# Patient Record
Sex: FEMALE | Race: WHITE | NOT HISPANIC OR LATINO
[De-identification: names, ages, dates, MRNs, and addresses within clinical notes are randomized per-mention and may not be internally consistent; named-entity substitution may affect disease eponyms.]

---

## 2020-05-13 ENCOUNTER — APPOINTMENT (OUTPATIENT)
Dept: NEUROLOGY | Facility: CLINIC | Age: 55
End: 2020-05-13
Payer: COMMERCIAL

## 2020-05-13 ENCOUNTER — NON-APPOINTMENT (OUTPATIENT)
Age: 55
End: 2020-05-13

## 2020-05-13 ENCOUNTER — APPOINTMENT (OUTPATIENT)
Dept: CARDIOLOGY | Facility: CLINIC | Age: 55
End: 2020-05-13
Payer: COMMERCIAL

## 2020-05-13 VITALS
BODY MASS INDEX: 25.33 KG/M2 | DIASTOLIC BLOOD PRESSURE: 85 MMHG | SYSTOLIC BLOOD PRESSURE: 138 MMHG | HEART RATE: 76 BPM | HEIGHT: 65 IN | WEIGHT: 152 LBS

## 2020-05-13 PROCEDURE — 99204 OFFICE O/P NEW MOD 45 MIN: CPT | Mod: 95

## 2020-05-13 PROCEDURE — 99205 OFFICE O/P NEW HI 60 MIN: CPT

## 2020-05-13 PROCEDURE — 0296T: CPT

## 2020-05-13 PROCEDURE — 93000 ELECTROCARDIOGRAM COMPLETE: CPT | Mod: 59

## 2020-05-13 NOTE — ASSESSMENT
[FreeTextEntry1] : #1) cardiorespiratory collapse and arrest.\par At this point, I believe this patient had a primary arrhythmia probably due to her underlying mitral valve prolapse. I disagree with the assessment at the [facility] that this was a respiratory arrest. There was no antecedent symptoms to suggest pneumonia such as fever or cough. Whatever infiltrate she had on her x-ray and CT of the chest probably relates to post arrest events. My suspicion is that she had a cardiac arrhythmia, probably ventricular tachycardia leading to VF due to mitral valve prolapse syndrome.\par Sincerely echocardiogram was normal as far as all dysfunction, it is unlikely that she has a cardiomyopathy.\par Although the patient has no risk factors for CAD, it would be imperative to rule out concomitant coronary disease or anomalous coronary arteries. It is important to also rule out IHSS.\par \par #2) mitral valve prolapse, undoubtedly related to myxomatous mitral valve prolapse syndrome. I suspect that she has moderate to severe mitral valvular insufficiency with his very prominent murmur. This certainly can induce ventricular tachyarrhythmias. We'll therefore obtain in addition to a baseline echocardiogram to see a recording.\par \par Plan:\par Resume metoprolol 25 mg p.o. b.i.d. for cardioprotection.\par Schedule cardiac CTA to rule out anomalous coronary arteries and CAD.\par Schedule cardiac echocardiogram to assess mitral valve prolapse, degree of MR, and to rule out other unsuspected cardiac valvulopathy.\par Schedule Zio recording to ascertain any ventricular tachyarrhythmias.\par \par Further plans including addressing the mitral valve insufficiency and prolapse with invasive procedures will follow these studies. In addition a consideration for formal electrophysiology will follow as well. All of this was discussed with the patient and her  who was present throughout. All of their questions were answered.

## 2020-05-13 NOTE — HISTORY OF PRESENT ILLNESS
[FreeTextEntry1] : The patient is a 54 year-old woman who is well and in her usual state of good health until April 30. She was in the midst of recovering from an ankle fracture, sustained from a fall on the ice in December, 2019. She was doing virtual physical therapy at home when she suddenly call out for assistance. Her , with who was home and who is a volunteer with the fire Department responded. He found her on the floor and summoned 911. When EMS arrived they attach her to a defibrillator and found her to be in ventricular fibrillation. She was successfully defibrillated. She was then taken to [facility] where she was hospitalized through May 4. During that admission the following testing was done:\par #1) CT of the chest: Left lower lobe collapse. No evidence of pulmonary embolism by PE protocol.\par #2) borderline troponins not consistent with acute MI\par #3) echocardiogram--reportedly showing normal LV function. No comment on mitral valve structure.\par The patient was intubated and maintained on a ventilator. She was successfully extubated and treated for pneumonia with possible aspiration. She was discharged from the hospital on May 4 and has been home since. She saw her primary care physician last week. He referred her to cardiology.\par The patient does carry a diagnosis of mitral valve prolapse. She has known of a heart murmur for many many years.\par Approximately 4 years ago, the patient had an episode of syncope for which she was placed on metoprolol tartrate 5 mg b.i.d. She continued to take metoprolol tartrate all the time of this event but has been off it since.\par The patient has enjoyed good health. No history of hypertension or diabetes or hyperlipidemia. She is a nonsmoker--never.\par \par \par

## 2020-05-13 NOTE — PHYSICAL EXAM
[Normal Appearance] : normal appearance [General Appearance - Well Developed] : well developed [Well Groomed] : well groomed [General Appearance - Well Nourished] : well nourished [General Appearance - In No Acute Distress] : no acute distress [No Deformities] : no deformities [Eyelids - No Xanthelasma] : the eyelids demonstrated no xanthelasmas [Normal Conjunctiva] : the conjunctiva exhibited no abnormalities [No Oral Pallor] : no oral pallor [Normal Oral Mucosa] : normal oral mucosa [Normal Jugular Venous A Waves Present] : normal jugular venous A waves present [No Oral Cyanosis] : no oral cyanosis [Normal Jugular Venous V Waves Present] : normal jugular venous V waves present [5th Left ICS - MCL] : palpated at the 5th LICS in the midclavicular line [No Jugular Venous Blake A Waves] : no jugular venous blake A waves [Normal] : normal [Rhythm Regular] : regular [Normal Rate] : normal [Normal S1] : normal S1 [No Murmur] : no murmurs heard [Normal S2] : normal S2 [No Abnormalities] : the abdominal aorta was not enlarged and no bruit was heard [No Pitting Edema] : no pitting edema present [Respiration, Rhythm And Depth] : normal respiratory rhythm and effort [Exaggerated Use Of Accessory Muscles For Inspiration] : no accessory muscle use [Auscultation Breath Sounds / Voice Sounds] : lungs were clear to auscultation bilaterally [Abdomen Tenderness] : non-tender [Abdomen Soft] : soft [Abdomen Mass (___ Cm)] : no abdominal mass palpated [Gait - Sufficient For Exercise Testing] : the gait was sufficient for exercise testing [Abnormal Walk] : normal gait [Nail Clubbing] : no clubbing of the fingernails [Cyanosis, Localized] : no localized cyanosis [Petechial Hemorrhages (___cm)] : no petechial hemorrhages [] : no rash [No Venous Stasis] : no venous stasis [Skin Color & Pigmentation] : normal skin color and pigmentation [No Xanthoma] : no  xanthoma was observed [Skin Lesions] : no skin lesions [No Skin Ulcers] : no skin ulcer [Oriented To Time, Place, And Person] : oriented to person, place, and time [Affect] : the affect was normal [No Anxiety] : not feeling anxious [Mood] : the mood was normal [Click] : a ~M click was heard [III] : a grade 3 [Left Carotid Bruit] : no bruit heard over the left carotid [Right Carotid Bruit] : no bruit heard over the right carotid [Left Femoral Bruit] : no bruit heard over the left femoral artery [Right Femoral Bruit] : no bruit heard over the right femoral artery [Lt] : no varicose veins of the left leg [Bruit] : no bruit heard [Rt] : no varicose veins of the right leg

## 2020-05-14 ENCOUNTER — RESULT REVIEW (OUTPATIENT)
Age: 55
End: 2020-05-14

## 2020-05-18 ENCOUNTER — RESULT REVIEW (OUTPATIENT)
Age: 55
End: 2020-05-18

## 2020-05-18 ENCOUNTER — APPOINTMENT (OUTPATIENT)
Dept: CARDIOLOGY | Facility: CLINIC | Age: 55
End: 2020-05-18
Payer: COMMERCIAL

## 2020-05-18 PROCEDURE — 0296T: CPT

## 2020-05-18 NOTE — CONSULT LETTER
[Dear  ___] : Dear  [unfilled], [Consult Letter:] : I had the pleasure of evaluating your patient, [unfilled]. [FreeTextEntry3] : Sincerely,\par \par Toño Sage M.D.\par

## 2020-05-18 NOTE — PHYSICAL EXAM
[FreeTextEntry1] : Physical examination \par General: No acute distress, Awake, Alert. \par \par Mental status \par Awake, alert, and oriented to person, time and place, Normal attention span and concentration, Recent and remote memory intact, Language intact, Fund of knowledge intact. \par Cranial Nerves \par II: VFF \par III, IV, VI: EOMI. \par V: Facial sensation appears normal. \par VII: Facial strength is normal B/L. \par VIII: Gross hearing is intact. \par IX, X: Palate is midline and elevates symmetrically. \par XI: Trapezius normal strength. \par XII: Tongue midline without atrophy or fasciculations. \par \par Motor exam \par moving all 4 ext.\par \par

## 2020-05-18 NOTE — ASSESSMENT
[FreeTextEntry1] : I am not fully convinced that she had a primary cardiac event.\par Consider EEG and imaging once cardiac testing is complete.

## 2020-05-18 NOTE — HISTORY OF PRESENT ILLNESS
[Home] : at home, [unfilled] , at the time of the visit. [Other Location: e.g. Home (Enter Location, City,State)___] : at [unfilled] [Self] : self [Patient] : the patient [FreeTextEntry1] : This is a 54 year old woman who is being seen in neurologic consultation at the request of Dr. Villegas for evaluation of syncope and collapse. She broke her right foot in December, 2019 and has been getting PT. About 10 days ago she was undergoing PT at home and  noted that she became disoriented, SOB.  noted she went rigid with tongue hanging out, and lost bladder control. She slid to the floor- paramedics were called. She had CPR done and says she was defibrillated in the field and intubated. Taken to [facility]. Discharged home with diagnosis of neurocardiogenic syncope. She was extubated the following day. Per my review of records no imaging was done. The physicians at [facility] did not think that she had cardiac arrest. However, Dr. Carlson who evaluated the patient today feels that she likely did and has ordered a complete cardiac workup.\par  [FreeTextEntry4] :

## 2020-05-20 ENCOUNTER — APPOINTMENT (OUTPATIENT)
Dept: NEUROLOGY | Facility: CLINIC | Age: 55
End: 2020-05-20
Payer: COMMERCIAL

## 2020-05-20 PROCEDURE — 99212 OFFICE O/P EST SF 10 MIN: CPT | Mod: 95

## 2020-05-21 NOTE — PHYSICAL EXAM
[FreeTextEntry1] : Physical examination \par General: No acute distress, Awake, Alert. \par \par Mental status \par Awake, alert, and oriented to person, time and place, Normal attention span and concentration, Recent and remote memory intact, Language intact, Fund of knowledge intact. \par \par Motor exam \par moving all ext equally\par \par

## 2020-05-21 NOTE — HISTORY OF PRESENT ILLNESS
[Home] : at home, [unfilled] , at the time of the visit. [Other Location: e.g. Home (Enter Location, City,State)___] : at [unfilled] [Verbal consent obtained from patient] : the patient, [unfilled] [FreeTextEntry1] : No new complaints. Doing well.\par Is undergoing a complete cardiac evaluation.\par Still concerned about her syncopal episode and abnormal movement at the end of it.\par

## 2020-05-27 ENCOUNTER — APPOINTMENT (OUTPATIENT)
Dept: CARDIOLOGY | Facility: CLINIC | Age: 55
End: 2020-05-27
Payer: COMMERCIAL

## 2020-05-27 VITALS
HEART RATE: 76 BPM | HEIGHT: 65 IN | DIASTOLIC BLOOD PRESSURE: 68 MMHG | BODY MASS INDEX: 24.99 KG/M2 | SYSTOLIC BLOOD PRESSURE: 110 MMHG | WEIGHT: 150 LBS

## 2020-05-27 PROCEDURE — 99214 OFFICE O/P EST MOD 30 MIN: CPT

## 2020-05-27 NOTE — HISTORY OF PRESENT ILLNESS
[FreeTextEntry1] : The patient is a 54 year-old woman who is well and in her usual state of good health until April 30. She was in the midst of recovering from an ankle fracture, sustained from a fall on the ice in December, 2019. She was doing virtual physical therapy at home when she suddenly call out for assistance. Her , with who was home and who is a volunteer with the fire Department responded. He found her on the floor and summoned 911. When EMS arrived they attach her to a defibrillator and found her to be in ventricular fibrillation. She was successfully defibrillated. She was then taken to [facility] where she was hospitalized through May 4. During that admission the following testing was done:\par #1) CT of the chest: Left lower lobe collapse. No evidence of pulmonary embolism by PE protocol.\par #2) borderline troponins not consistent with acute MI\par #3) echocardiogram--reportedly showing normal LV function. No comment on mitral valve structure.\par The patient was intubated and maintained on a ventilator. She was successfully extubated and treated for pneumonia with possible aspiration. She was discharged from the hospital on May 4 and has been home since.The discharge dx from [facility] was syncope. No evidence of cardiac arrhythmia on monitoring. She saw her primary care physician last week. He referred her to cardiology.\par The patient does carry a diagnosis of mitral valve prolapse. She has known of a heart murmur for many many years.\par Approximately 4 years ago, the patient had an episode of syncope for which she was placed on metoprolol tartrate 5 mg b.i.d. She continued to take metoprolol tartrate all the time of this event but has been off it since.\par The patient has enjoyed good health. No history of hypertension or diabetes or hyperlipidemia. She is a nonsmoker--never.\par =================================================================================\par TESTING, SINCE LAST VISIT:\par 1) Echo: Normal LV size and contractility  EF 66%\par           Mildly thickened MV WITH MILD ANTERIOR LEAFLET PROLAPSE \par           ??Septal hypokinesis--uncertain finding\par 2) CTA: Normal Study\par          No CAD and no anomalous coronary arteries so hours ago mitchel was a\par 3) 2 Week Zio Recording: PENDING\par           \par ==================================================================================ro\par \par \par

## 2020-05-27 NOTE — ASSESSMENT
[FreeTextEntry1] : #1) cardiorespiratory collapse and arrest.\par At this point, I believe this patient had a primary arrhythmia probably due to her underlying mitral valve prolapse. I disagree with the assessment at the Medical Center that this was a respiratory arrest. There was no antecedent symptoms to suggest pneumonia such as fever or cough. Whatever infiltrate she had on her x-ray and CT of the chest probably relates to post arrest events. \par Syndrome with syncope. This relates to simple vasovagal syncope versus a true cardiac arrhythmia he is to be determined. She is currently wearing a ZIO recorder. This was placed last Monday and will come off next Monday. This will determine the next step. Most likely I will refer her to cardiac electrophysiology for further studies. In the meantime I have encouraged the patient to remain active but not formally exercise. She can resume her physical therapy. 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 I suspect that she has moderate mitral valvular insufficiency with his very prominent murmur. This certainly can induce ventricular tachyarrhythmias. We'll therefore obtain in addition to a baseline echocardiogram to see a recording.\par \par Plan:\par Continue metoprolol 25 mg p.o. b.i.d. for cardioprotection.\par \par Complete Zio recording to ascertain any ventricular tachyarrhythmias.\par \par Further plans including addressing the mitral valve insufficiency and prolapse with invasive procedures will follow these studies. In addition a consideration for formal electrophysiology will follow as well. All of this was discussed with the patient and her  who was present throughout. All of their questions were answered.

## 2020-05-27 NOTE — PHYSICAL EXAM
[Normal Appearance] : normal appearance [General Appearance - Well Developed] : well developed [General Appearance - Well Nourished] : well nourished [Well Groomed] : well groomed [No Deformities] : no deformities [General Appearance - In No Acute Distress] : no acute distress [Normal Conjunctiva] : the conjunctiva exhibited no abnormalities [Eyelids - No Xanthelasma] : the eyelids demonstrated no xanthelasmas [Normal Oral Mucosa] : normal oral mucosa [No Oral Pallor] : no oral pallor [Normal Jugular Venous A Waves Present] : normal jugular venous A waves present [No Oral Cyanosis] : no oral cyanosis [No Jugular Venous Blake A Waves] : no jugular venous blake A waves [Normal Jugular Venous V Waves Present] : normal jugular venous V waves present [Respiration, Rhythm And Depth] : normal respiratory rhythm and effort [Exaggerated Use Of Accessory Muscles For Inspiration] : no accessory muscle use [Auscultation Breath Sounds / Voice Sounds] : lungs were clear to auscultation bilaterally [Abdomen Soft] : soft [Abdomen Tenderness] : non-tender [Abdomen Mass (___ Cm)] : no abdominal mass palpated [Abnormal Walk] : normal gait [Nail Clubbing] : no clubbing of the fingernails [Gait - Sufficient For Exercise Testing] : the gait was sufficient for exercise testing [Petechial Hemorrhages (___cm)] : no petechial hemorrhages [Cyanosis, Localized] : no localized cyanosis [] : no rash [Skin Color & Pigmentation] : normal skin color and pigmentation [No Venous Stasis] : no venous stasis [Skin Lesions] : no skin lesions [No Skin Ulcers] : no skin ulcer [No Xanthoma] : no  xanthoma was observed [Oriented To Time, Place, And Person] : oriented to person, place, and time [Mood] : the mood was normal [Affect] : the affect was normal [No Anxiety] : not feeling anxious [5th Left ICS - MCL] : palpated at the 5th LICS in the midclavicular line [Normal] : normal [Normal Rate] : normal [Rhythm Regular] : regular [Normal S1] : normal S1 [Click] : a ~M click was heard [Normal S2] : normal S2 [No Murmur] : no murmurs heard [III] : a grade 3 [No Abnormalities] : the abdominal aorta was not enlarged and no bruit was heard [No Pitting Edema] : no pitting edema present [Right Carotid Bruit] : no bruit heard over the right carotid [Left Carotid Bruit] : no bruit heard over the left carotid [Right Femoral Bruit] : no bruit heard over the right femoral artery [Left Femoral Bruit] : no bruit heard over the left femoral artery [Bruit] : no bruit heard [Rt] : no varicose veins of the right leg [Lt] : no varicose veins of the left leg

## 2020-06-01 ENCOUNTER — RESULT REVIEW (OUTPATIENT)
Age: 55
End: 2020-06-01

## 2020-06-01 PROCEDURE — 0298T: CPT

## 2020-06-03 ENCOUNTER — APPOINTMENT (OUTPATIENT)
Dept: NEUROLOGY | Facility: CLINIC | Age: 55
End: 2020-06-03
Payer: COMMERCIAL

## 2020-06-03 PROCEDURE — 95819 EEG AWAKE AND ASLEEP: CPT

## 2020-06-12 RX ORDER — ENOXAPARIN SODIUM 100 MG/ML
40 INJECTION SUBCUTANEOUS
Qty: 12 | Refills: 0 | Status: DISCONTINUED | COMMUNITY
Start: 2019-12-12 | End: 2020-06-12

## 2020-06-16 ENCOUNTER — APPOINTMENT (OUTPATIENT)
Dept: NEUROLOGY | Facility: CLINIC | Age: 55
End: 2020-06-16
Payer: COMMERCIAL

## 2020-06-16 PROCEDURE — 99214 OFFICE O/P EST MOD 30 MIN: CPT | Mod: 95

## 2020-06-17 ENCOUNTER — RESULT REVIEW (OUTPATIENT)
Age: 55
End: 2020-06-17

## 2020-06-18 NOTE — ASSESSMENT
[FreeTextEntry1] : She will get an MRI of the brain with and without gadolinium. This was ordered at the last visit. We discussed the EEG in detail. I would like her to have a 72 hour ambulatory EEG. Based on this we may need to place her on antiepileptic medication. Further recommendations based on test results.

## 2020-06-18 NOTE — HISTORY OF PRESENT ILLNESS
[Home] : at home, [unfilled] , at the time of the visit. [Other Location: e.g. Home (Enter Location, City,State)___] : at [unfilled] [Verbal consent obtained from patient] : the patient, [unfilled] [FreeTextEntry1] : Shanon is doing over all. She has had no further episodes of losing consciousness or syncope like she presented with a few weeks ago. We discussed her EEG results. The EEG showed a rare left temporal sharp waves. Also noted were occasional polymorphic delta slowing over bilateral temporal regions with the left side being more involved than the right side. The interpreting physician was concerned with the potentially epileptogenic focal area of cerebral dysfunction.\par Patient has no history of seizures.

## 2020-06-19 ENCOUNTER — INPATIENT (INPATIENT)
Facility: HOSPITAL | Age: 55
LOS: 4 days | Discharge: HOME | DRG: 225 | End: 2020-06-24
Attending: INTERNAL MEDICINE | Admitting: INTERNAL MEDICINE
Payer: COMMERCIAL

## 2020-06-19 ENCOUNTER — APPOINTMENT (OUTPATIENT)
Dept: HEART AND VASCULAR | Facility: CLINIC | Age: 55
End: 2020-06-19
Payer: COMMERCIAL

## 2020-06-19 VITALS
DIASTOLIC BLOOD PRESSURE: 96 MMHG | TEMPERATURE: 98 F | HEART RATE: 84 BPM | RESPIRATION RATE: 18 BRPM | HEIGHT: 65 IN | WEIGHT: 160.06 LBS | OXYGEN SATURATION: 98 % | SYSTOLIC BLOOD PRESSURE: 144 MMHG

## 2020-06-19 DIAGNOSIS — I34.1 NONRHEUMATIC MITRAL (VALVE) PROLAPSE: ICD-10-CM

## 2020-06-19 DIAGNOSIS — I47.2 VENTRICULAR TACHYCARDIA: ICD-10-CM

## 2020-06-19 DIAGNOSIS — Z86.74 PERSONAL HISTORY OF SUDDEN CARDIAC ARREST: ICD-10-CM

## 2020-06-19 DIAGNOSIS — I20.0 UNSTABLE ANGINA: ICD-10-CM

## 2020-06-19 DIAGNOSIS — I49.9 CARDIAC ARRHYTHMIA, UNSPECIFIED: ICD-10-CM

## 2020-06-19 DIAGNOSIS — I49.3 VENTRICULAR PREMATURE DEPOLARIZATION: ICD-10-CM

## 2020-06-19 DIAGNOSIS — I10 ESSENTIAL (PRIMARY) HYPERTENSION: ICD-10-CM

## 2020-06-19 LAB
ALBUMIN SERPL ELPH-MCNC: 4.2 G/DL — SIGNIFICANT CHANGE UP (ref 3.3–5)
ALP SERPL-CCNC: 122 U/L — HIGH (ref 40–120)
ALT FLD-CCNC: 17 U/L — SIGNIFICANT CHANGE UP (ref 10–45)
ANION GAP SERPL CALC-SCNC: 14 MMOL/L — SIGNIFICANT CHANGE UP (ref 5–17)
APTT BLD: 32.7 SEC — SIGNIFICANT CHANGE UP (ref 27.5–36.3)
AST SERPL-CCNC: 30 U/L — SIGNIFICANT CHANGE UP (ref 10–40)
BASOPHILS # BLD AUTO: 0.05 K/UL — SIGNIFICANT CHANGE UP (ref 0–0.2)
BASOPHILS NFR BLD AUTO: 0.6 % — SIGNIFICANT CHANGE UP (ref 0–2)
BILIRUB SERPL-MCNC: 0.2 MG/DL — SIGNIFICANT CHANGE UP (ref 0.2–1.2)
BUN SERPL-MCNC: 17 MG/DL — SIGNIFICANT CHANGE UP (ref 7–23)
CALCIUM SERPL-MCNC: 10 MG/DL — SIGNIFICANT CHANGE UP (ref 8.4–10.5)
CHLORIDE SERPL-SCNC: 104 MMOL/L — SIGNIFICANT CHANGE UP (ref 96–108)
CO2 SERPL-SCNC: 25 MMOL/L — SIGNIFICANT CHANGE UP (ref 22–31)
CREAT SERPL-MCNC: 0.83 MG/DL — SIGNIFICANT CHANGE UP (ref 0.5–1.3)
EOSINOPHIL # BLD AUTO: 0.12 K/UL — SIGNIFICANT CHANGE UP (ref 0–0.5)
EOSINOPHIL NFR BLD AUTO: 1.3 % — SIGNIFICANT CHANGE UP (ref 0–6)
GLUCOSE SERPL-MCNC: 97 MG/DL — SIGNIFICANT CHANGE UP (ref 70–99)
HCT VFR BLD CALC: 45.6 % — HIGH (ref 34.5–45)
HGB BLD-MCNC: 14.4 G/DL — SIGNIFICANT CHANGE UP (ref 11.5–15.5)
IMM GRANULOCYTES NFR BLD AUTO: 0.3 % — SIGNIFICANT CHANGE UP (ref 0–1.5)
INR BLD: 1.01 — SIGNIFICANT CHANGE UP (ref 0.88–1.16)
LYMPHOCYTES # BLD AUTO: 2.8 K/UL — SIGNIFICANT CHANGE UP (ref 1–3.3)
LYMPHOCYTES # BLD AUTO: 31.1 % — SIGNIFICANT CHANGE UP (ref 13–44)
MCHC RBC-ENTMCNC: 27.9 PG — SIGNIFICANT CHANGE UP (ref 27–34)
MCHC RBC-ENTMCNC: 31.6 GM/DL — LOW (ref 32–36)
MCV RBC AUTO: 88.4 FL — SIGNIFICANT CHANGE UP (ref 80–100)
MONOCYTES # BLD AUTO: 0.69 K/UL — SIGNIFICANT CHANGE UP (ref 0–0.9)
MONOCYTES NFR BLD AUTO: 7.7 % — SIGNIFICANT CHANGE UP (ref 2–14)
NEUTROPHILS # BLD AUTO: 5.32 K/UL — SIGNIFICANT CHANGE UP (ref 1.8–7.4)
NEUTROPHILS NFR BLD AUTO: 59 % — SIGNIFICANT CHANGE UP (ref 43–77)
NRBC # BLD: 0 /100 WBCS — SIGNIFICANT CHANGE UP (ref 0–0)
PLATELET # BLD AUTO: 377 K/UL — SIGNIFICANT CHANGE UP (ref 150–400)
POTASSIUM SERPL-MCNC: 4.5 MMOL/L — SIGNIFICANT CHANGE UP (ref 3.5–5.3)
POTASSIUM SERPL-SCNC: 4.5 MMOL/L — SIGNIFICANT CHANGE UP (ref 3.5–5.3)
PROT SERPL-MCNC: 7.7 G/DL — SIGNIFICANT CHANGE UP (ref 6–8.3)
PROTHROM AB SERPL-ACNC: 11.5 SEC — SIGNIFICANT CHANGE UP (ref 10–12.9)
RBC # BLD: 5.16 M/UL — SIGNIFICANT CHANGE UP (ref 3.8–5.2)
RBC # FLD: 13.1 % — SIGNIFICANT CHANGE UP (ref 10.3–14.5)
SARS-COV-2 RNA SPEC QL NAA+PROBE: SIGNIFICANT CHANGE UP
SODIUM SERPL-SCNC: 143 MMOL/L — SIGNIFICANT CHANGE UP (ref 135–145)
TROPONIN T SERPL-MCNC: <0.01 NG/ML — SIGNIFICANT CHANGE UP (ref 0–0.01)
WBC # BLD: 9.01 K/UL — SIGNIFICANT CHANGE UP (ref 3.8–10.5)
WBC # FLD AUTO: 9.01 K/UL — SIGNIFICANT CHANGE UP (ref 3.8–10.5)

## 2020-06-19 PROCEDURE — 99223 1ST HOSP IP/OBS HIGH 75: CPT

## 2020-06-19 PROCEDURE — 99204 OFFICE O/P NEW MOD 45 MIN: CPT | Mod: 95

## 2020-06-19 PROCEDURE — 99285 EMERGENCY DEPT VISIT HI MDM: CPT

## 2020-06-19 PROCEDURE — 93010 ELECTROCARDIOGRAM REPORT: CPT

## 2020-06-19 PROCEDURE — 71045 X-RAY EXAM CHEST 1 VIEW: CPT | Mod: 26

## 2020-06-19 RX ORDER — METOPROLOL TARTRATE 50 MG
25 TABLET ORAL
Refills: 0 | Status: DISCONTINUED | OUTPATIENT
Start: 2020-06-19 | End: 2020-06-24

## 2020-06-19 NOTE — ED ADULT NURSE NOTE - OBJECTIVE STATEMENT
Pt reports "my heart stopped and I was intubated a month ago and they don't know why so my doctor sent me in for a procedure and heart MRI." Pt denies chest pain, sob, dizziness, n/v, weakness, chills, fever.

## 2020-06-19 NOTE — H&P ADULT - PROBLEM SELECTOR PLAN 1
currently asymptomatic, EKG revealed NSR@68BPM with no acute ST/T wave changes, q waves anteroseptal leads. CXR unremarkable. Labs notable for: Cardiac enzymes negative x 1 set.  --Outpatient Echocardiogram 5/15/20 revealed  LVEF: 66 %. In certain views there appears to be some hypokinesis mid to distal anteroseptum.  Mild-to-moderate MR. Mild prolapse of the anterior and posterial mitral valve leaflet.   Mild TR/MR. --CTA Coronaries 5/18/20 revealed calcium score of 0, normal coronaries, EF:60%. --Holter monitor revealed frequent PVCs. (as per patient)  --Will continue Metoprolol Tartrate 25mg PO BID.  --Obtain Cardiac MRI with and without contrast for further evaluation.  --Possible EPS/ICD placement this admission.

## 2020-06-19 NOTE — ED ADULT NURSE NOTE - CHPI ED NUR SYMPTOMS NEG
no chills/no congestion/no syncope/no dizziness/no shortness of breath/no vomiting/no diaphoresis/no fever/no back pain/no nausea

## 2020-06-19 NOTE — H&P ADULT - NSICDXPASTMEDICALHX_GEN_ALL_CORE_FT
PAST MEDICAL HISTORY:  No pertinent past medical history PAST MEDICAL HISTORY:  MVP (mitral valve prolapse)

## 2020-06-19 NOTE — H&P ADULT - HISTORY OF PRESENT ILLNESS
54 yr old F with PMHx of  MVP who initially presented to [facility] 4/30/20 s/p Vfib arrest after achieving ROSC in the field. Patient states she was in performing virtual PT for her recovering ankle fracture when she suddenly called out for assistant. Her , with who was home and who is a volunteer with the fire Department responded. He found her on the floor and summoned 911. When EMS arrived they attach her to a defibrillator and found her to be in Vfib and she was successfully defibrillated. Patient was then taken to [facility] where she was hospitalized, intubated and maintained on a ventilator through May 4. During admission: CT chest with LLL Left collapse/no PE, borderline troponins not consistent with acute MI and Echo reportedly showing normal LV function. No comment on mitral valve structure.    Patient subsequently evaluated by outpatient cardiologist for work-up. Patient reports ~4 years ago she had an episode of syncope for which she was placed on metoprolol tartrate PO BID.     Patient now presents to [facility] ED 6/19/20 for cardiac evaluation. Patient denies any chest pain, SOB, dizziness, palpitations, AHUMADA, recent PND, orthopnea, LE edema, recent travel or sick contacts. Patient states she has not had any symptoms since being discharged from [facility].        Outpatient Echocardiogram 5/15/20 revealed  LVEF: 66 %. In certain views there appears to be some hypokinesis mid to distal anteroseptum.  Mild-to-moderate MR. Mild prolapse of the anterior and posterial mitral valve leaflet.   Mild TR/MR.   CTA Coronaries 5/18/20 revealed calcium score of 0, normal coronaries, EF:60%.   Holter monitor revealed frequent PVCs.  MRI brain 6/17/20 revealed no evidence of recent infarct, edema, demyelination or mass. No intracranial enhancing lesion.         In ED, /96, HR: 84, RR:18, Temp: 97.9F, O2 sat: 98% RA.    EKG revealed NSR@68BPM with no acute ST/T wave changes, q waves anteroseptal leads. CXR unremarkable.    Labs notable for: Cardiac enzymes negative x 1 set, COVID PCR negative.    Patient currently stable, admitted to cardiac telemetry for further cardiac work-up and possible ICD implant this admission. 54 yr old F with PMHx of  MVP who initially presented to [facility] 4/30/20 s/p Vfib arrest with successful ROSC in the field. Patient states she was in performing virtual PT for her recovering ankle fracture when she suddenly called out for assistantance. Her , a volunteer with the fire Department responded and initiated CPR. When EMS arrived they attached her to an AED and found her to be in Vfib for which she was successfully defibrillated. Patient was then taken to [facility] where she was hospitalized, intubated and maintained on a ventilator through 5/4/20. During admission: CT chest with LLL Left collapse/no PE, borderline troponins not consistent with acute MI and Echo reportedly showing normal LV function. No comment on mitral valve structure.    Patient seen by outpatient cardiologist Dr. Carlson for work-up after discharge. Patient admits ~4 years ago she had an episode of syncope for which she was placed on metoprolol tartrate PO BID. Outpatient Echocardiogram 5/15/20 revealed  LVEF: 66 %. In certain views there appears to be some hypokinesis mid to distal anteroseptum.  Mild-to-moderate MR. Mild prolapse of the anterior and posterial mitral valve leaflet.   Mild TR/MR. CTA Coronaries 5/18/20 revealed calcium score of 0, normal coronaries, EF:60%. Holter monitor revealed frequent PVCs. (as per patient) MRI brain 6/17/20 revealed no evidence of recent infarct, edema, demyelination or mass. No intracranial enhancing lesion.      Patient now referred to [facility] ED 6/19/20 for cardiac evaluation. Patient denies any chest pain, SOB, dizziness, palpitations, AHUMADA, recent PND, orthopnea, LE edema, recent travel or sick contacts. Patient states she has not had any symptoms since being discharged from [facility].       In ED, /96, HR: 84, RR:18, Temp: 97.9F, O2 sat: 98% RA. EKG revealed NSR@68BPM with no acute ST/T wave changes, q waves anteroseptal leads. CXR unremarkable. Labs notable for: Cardiac enzymes negative x 1 set, COVID PCR negative.    Patient currently stable, admitted to cardiac telemetry for further cardiac work-up and possible ICD implant this admission. 54 yr old F with PMHx of  MVP who initially presented to [facility] 4/30/20 s/p suspected Vfib arrest with successful ROSC in the field. Patient states she was in performing virtual PT for her recovering ankle fracture when she suddenly called out for assistantance. Her , a volunteer with the fire Department responded and initiated CPR. When EMS arrived they attached her to an AED and found her to be in Vfib (rhythm strip not available) for which she was successfully defibrillated. Patient was then taken to [facility] where she was hospitalized, intubated ~24hrs and ultimately discharged 5/4/20. During admission: CT chest with LLL Left collapse/no PE, borderline troponins not consistent with acute MI and Echo reportedly showing normal LV function. No comment on mitral valve structure.     Patient seen by outpatient cardiologist Dr. Carlson for work-up after discharge. Patient admits ~4 years ago she had an episode of syncope for which she was placed on metoprolol tartrate PO BID. Outpatient Echocardiogram 5/15/20 revealed  LVEF: 66 %. In certain views there appears to be some hypokinesis mid to distal anteroseptum.  Mild-to-moderate MR. Mild prolapse of the anterior and posterial mitral valve leaflet.   Mild TR/MR. CTA Coronaries 5/18/20 revealed calcium score of 0, normal coronaries, EF:60%. Holter monitor revealed frequent PVCs. (as per patient) MRI brain 6/17/20 revealed no evidence of recent infarct, edema, demyelination or mass. No intracranial enhancing lesion.      Patient now referred to [facility] ED 6/19/20 for cardiac evaluation. Patient denies any chest pain, SOB, dizziness, palpitations, AHUMADA, recent PND, orthopnea, LE edema, recent travel or sick contacts. Patient states she has not had any symptoms since being discharged from [facility].       In ED, /96, HR: 84, RR:18, Temp: 97.9F, O2 sat: 98% RA. EKG revealed NSR@68BPM with no acute ST/T wave changes, q waves anteroseptal leads. CXR unremarkable. Labs notable for: Cardiac enzymes negative x 1 set, COVID PCR negative.    Patient currently stable, admitted to cardiac telemetry for further cardiac work-up and possible ICD implant this admission.

## 2020-06-19 NOTE — ED PROVIDER NOTE - OBJECTIVE STATEMENT
55 yo with ho of syncopal event w suspected Vfib, pt passed out, appeared to stop breathing, EMS called by  who started CPR and pt shocked by AED, no rhythm strip captured, this event occurred in Late April, pt sent in today for admission for EP study, Cardiac MRI and likely defib placement, pt denies CP denies palpitations, increased anxiety lately secondary to pandemic,   also notes during post syncope workup pt had outpt EEG which was mildly abnormal , pt reports both the neurologist and cardiologist did not think her event was seizure, no ho of drug or alcohol abuse , had outpt MR brain which was reportedly normal

## 2020-06-19 NOTE — ED ADULT NURSE NOTE - CHIEF COMPLAINT QUOTE
53 y/o female came in to ED, reports her PMD for "heart MRI" Pt reports her "heart stopped" One month ago, Pt denies any symptoms since discharged.

## 2020-06-19 NOTE — ED ADULT TRIAGE NOTE - CHIEF COMPLAINT QUOTE
55 y/o female came in to ED, reports her PMD for "heart MRI" Pt reports her "heart stopped" One month ago, Pt denies any symptoms since discharged.

## 2020-06-19 NOTE — H&P ADULT - ASSESSMENT
54 yr old F with PMHx of  MVP, recent Vfib arrest 4/30 achieved ROSC in the field, normal LV function on Echo, normal coronaries by CTA, who presents to [facility] ED 6/19/20 for further cardiac work-up and possible ICD implant this admission. 54 yr old F with PMHx of  MVP, recent suspected Vfib arrest 4/30 achieved ROSC in the field, normal LV function on Echo, normal coronaries by CTA, who presents to [facility] ED 6/19/20 for further cardiac work-up and possible ICD implant this admission.

## 2020-06-19 NOTE — ED PROVIDER NOTE - CLINICAL SUMMARY MEDICAL DECISION MAKING FREE TEXT BOX
55 yo currently assymptomatic w prior event suspicious for Vfib , sent in by Dr. Fallon for likely Defib placement after cardiac MR and EP study during admission.  also consider ACS, consider seizure 55 yo currently asymptomatic w prior event suspicious for Vfib , sent in by Dr. Petersen for likely Defib placement after cardiac MR and EP study during admission.  also consider ACS, consider seizure

## 2020-06-20 LAB
ANION GAP SERPL CALC-SCNC: 12 MMOL/L — SIGNIFICANT CHANGE UP (ref 5–17)
BUN SERPL-MCNC: 20 MG/DL — SIGNIFICANT CHANGE UP (ref 7–23)
CALCIUM SERPL-MCNC: 9.4 MG/DL — SIGNIFICANT CHANGE UP (ref 8.4–10.5)
CHLORIDE SERPL-SCNC: 105 MMOL/L — SIGNIFICANT CHANGE UP (ref 96–108)
CHOLEST SERPL-MCNC: 172 MG/DL — SIGNIFICANT CHANGE UP (ref 10–199)
CO2 SERPL-SCNC: 26 MMOL/L — SIGNIFICANT CHANGE UP (ref 22–31)
CREAT SERPL-MCNC: 0.87 MG/DL — SIGNIFICANT CHANGE UP (ref 0.5–1.3)
GLUCOSE SERPL-MCNC: 97 MG/DL — SIGNIFICANT CHANGE UP (ref 70–99)
HCT VFR BLD CALC: 42 % — SIGNIFICANT CHANGE UP (ref 34.5–45)
HDLC SERPL-MCNC: 56 MG/DL — SIGNIFICANT CHANGE UP
HGB BLD-MCNC: 13.3 G/DL — SIGNIFICANT CHANGE UP (ref 11.5–15.5)
LIPID PNL WITH DIRECT LDL SERPL: 91 MG/DL — SIGNIFICANT CHANGE UP
MAGNESIUM SERPL-MCNC: 2.1 MG/DL — SIGNIFICANT CHANGE UP (ref 1.6–2.6)
MCHC RBC-ENTMCNC: 28.1 PG — SIGNIFICANT CHANGE UP (ref 27–34)
MCHC RBC-ENTMCNC: 31.7 GM/DL — LOW (ref 32–36)
MCV RBC AUTO: 88.8 FL — SIGNIFICANT CHANGE UP (ref 80–100)
NRBC # BLD: 0 /100 WBCS — SIGNIFICANT CHANGE UP (ref 0–0)
PLATELET # BLD AUTO: 329 K/UL — SIGNIFICANT CHANGE UP (ref 150–400)
POTASSIUM SERPL-MCNC: 4.3 MMOL/L — SIGNIFICANT CHANGE UP (ref 3.5–5.3)
POTASSIUM SERPL-SCNC: 4.3 MMOL/L — SIGNIFICANT CHANGE UP (ref 3.5–5.3)
RBC # BLD: 4.73 M/UL — SIGNIFICANT CHANGE UP (ref 3.8–5.2)
RBC # FLD: 13.1 % — SIGNIFICANT CHANGE UP (ref 10.3–14.5)
SODIUM SERPL-SCNC: 143 MMOL/L — SIGNIFICANT CHANGE UP (ref 135–145)
TOTAL CHOLESTEROL/HDL RATIO MEASUREMENT: 3.1 RATIO — LOW (ref 3.3–7.1)
TRIGL SERPL-MCNC: 127 MG/DL — SIGNIFICANT CHANGE UP (ref 10–149)
TSH SERPL-MCNC: 1.87 UIU/ML — SIGNIFICANT CHANGE UP (ref 0.35–4.94)
WBC # BLD: 8.1 K/UL — SIGNIFICANT CHANGE UP (ref 3.8–10.5)
WBC # FLD AUTO: 8.1 K/UL — SIGNIFICANT CHANGE UP (ref 3.8–10.5)

## 2020-06-20 PROCEDURE — 99232 SBSQ HOSP IP/OBS MODERATE 35: CPT

## 2020-06-20 PROCEDURE — 75561 CARDIAC MRI FOR MORPH W/DYE: CPT | Mod: 26

## 2020-06-20 RX ORDER — ENOXAPARIN SODIUM 100 MG/ML
40 INJECTION SUBCUTANEOUS DAILY
Refills: 0 | Status: DISCONTINUED | OUTPATIENT
Start: 2020-06-20 | End: 2020-06-21

## 2020-06-20 RX ADMIN — Medication 25 MILLIGRAM(S): at 05:59

## 2020-06-20 RX ADMIN — ENOXAPARIN SODIUM 40 MILLIGRAM(S): 100 INJECTION SUBCUTANEOUS at 14:28

## 2020-06-20 RX ADMIN — Medication 25 MILLIGRAM(S): at 19:02

## 2020-06-20 NOTE — PROGRESS NOTE ADULT - SUBJECTIVE AND OBJECTIVE BOX
Cardiology PA Adult Progress Note    Subjective Assessment: Pt seen and examined at bedside this AM without any complaints or events overnight. She denies any CP, palpitations, dizziness, lightheadedness, SOB, N/V or abd pain.  ROS negative except for HPI or subjective.  	  MEDICATIONS:  metoprolol tartrate 25 milliGRAM(s) Oral two times a day  enoxaparin Injectable 40 milliGRAM(s) SubCutaneous daily    VITAL SIGNS:  T(C): 36.4 (06-20-20 @ 09:41), Max: 36.7 (06-19-20 @ 19:00)  HR: 60 (06-20-20 @ 09:20) (50 - 84)  BP: 130/62 (06-20-20 @ 09:20) (116/62 - 144/96)  RR: 18 (06-20-20 @ 09:20) (16 - 18)  SpO2: 97% (06-20-20 @ 09:20) (97% - 100%)  Height (cm): 165.1 (06-19 @ 23:09)  Weight (kg): 72.6 (06-19 @ 23:09)  BMI (kg/m2): 26.6 (06-19 @ 23:09)  BSA (m2): 1.8 (06-19 @ 23:09)    PHYSICAL EXAM:  Appearance: Normal	  HEENT:   Normal oral mucosa, PERRL, EOMI	  Neck: Supple, - JVD; No Carotid Bruit   Cardiovascular: Normal S1 S2, No JVD, No murmurs,   Respiratory: Lungs clear to auscultation, No Rales, Rhonchi, Wheezing	  Gastrointestinal:  Soft, Non-tender, + BS	  Skin: No rashes, No ecchymoses, No cyanosis  Extremities: Normal range of motion, No clubbing, cyanosis or edema  Vascular: Peripheral pulses palpable 2+ bilaterally  Neurologic: Non-focal  Psychiatry: A & O x 3, Mood & affect appropriate 	    LABS:	 	             13.3   8.10  )-----------( 329      ( 20 Jun 2020 05:46 )             42.0     143  |  105  |  20  ----------------------------<  97  4.3   |  26  |  0.87    Ca    9.4      20 Jun 2020 05:46  Mg     2.1     06-20    TPro  7.7  /  Alb  4.2  /  TBili  0.2  /  DBili  x   /  AST  30  /  ALT  17  /  AlkPhos  122<H>  06-19   TSH: Thyroid Stimulating Hormone, Serum: 1.869 uIU/mL (06-20 @ 05:46)  PT/INR - ( 19 Jun 2020 19:48 )   PT: 11.5 sec;   INR: 1.01     PTT - ( 19 Jun 2020 19:48 )  PTT:32.7 sec Cardiology PA Adult Progress Note    Subjective Assessment: Pt seen and examined at bedside this AM without any complaints or events overnight. She denies any CP, palpitations, dizziness, lightheadedness, SOB, N/V or abd pain.  ROS negative except for HPI or subjective.  	  MEDICATIONS:  metoprolol tartrate 25 milliGRAM(s) Oral two times a day  enoxaparin Injectable 40 milliGRAM(s) SubCutaneous daily    VITAL SIGNS:  T(C): 36.4 (06-20-20 @ 09:41), Max: 36.7 (06-19-20 @ 19:00)  HR: 60 (06-20-20 @ 09:20) (50 - 84)  BP: 130/62 (06-20-20 @ 09:20) (116/62 - 144/96)  RR: 18 (06-20-20 @ 09:20) (16 - 18)  SpO2: 97% (06-20-20 @ 09:20) (97% - 100%)  Height (cm): 165.1 (06-19 @ 23:09)  Weight (kg): 72.6 (06-19 @ 23:09)  BMI (kg/m2): 26.6 (06-19 @ 23:09)  BSA (m2): 1.8 (06-19 @ 23:09)    PHYSICAL EXAM:  Appearance: Normal	  HEENT:   Normal oral mucosa, PERRL, EOMI	  Neck: Supple, - JVD; No Carotid Bruit   Cardiovascular: Normal S1 S2, No JVD, No murmurs,   Respiratory: Lungs clear to auscultation, No Rales, Rhonchi, Wheezing	  Gastrointestinal:  Soft, Non-tender, + BS	  Skin: No rashes, No ecchymoses, No cyanosis  Extremities: Normal range of motion, No clubbing, cyanosis or edema  Vascular: Peripheral pulses palpable 2+ bilaterally  Neurologic: Non-focal  Psychiatry: A & O x 3, Mood & affect appropriate 	    LABS:	 	             13.3   8.10  )-----------( 329      ( 20 Jun 2020 05:46 )             42.0     143  |  105  |  20  ----------------------------<  97  4.3   |  26  |  0.87    Ca    9.4      20 Jun 2020 05:46  Mg     2.1     06-20  TPro  7.7  /  Alb  4.2  /  TBili  0.2  /  DBili  x   /  AST  30  /  ALT  17  /  AlkPhos  122<H>  06-19   TSH: Thyroid Stimulating Hormone, Serum: 1.869 uIU/mL (06-20 @ 05:46)  PT/INR - ( 19 Jun 2020 19:48 )   PT: 11.5 sec;   INR: 1.01     PTT - ( 19 Jun 2020 19:48 )  PTT:32.7 sec

## 2020-06-20 NOTE — PROGRESS NOTE ADULT - ASSESSMENT
55yo Female with PMHx of MVP, recent suspected Vfib arrest on 4/30 and achieved ROSC in the field, normal LV function on Echo, normal coronaries by CTA, who presents to [facility] ED 6/19/20 for further cardiac work-up and possible ICD implant this admission.

## 2020-06-20 NOTE — PROGRESS NOTE ADULT - PROBLEM SELECTOR PLAN 1
currently asymptomatic and HD stable  -EKG revealed NSR@68BPM with no acute ST/T wave changes, q waves anteroseptal leads; trop negative x 1  -Outpatient Echocardiogram 5/15/20 revealed LVEF: 66 %. In certain views there appears to be some hypokinesis mid to distal anteroseptum, Mild-mod MR, mild prolapse of the anterior and posterial mitral valve leaflet, Mild TR/MR.  -CCTA 5/18/20 revealed calcium score of 0, normal coronaries, EF:60%  -Holter monitor revealed frequent PVCs (as per patient)  -Plan for Cardiac MRI w/ and w/o contrast today, remain NPO  -Possible EPS/ICD placement this admission  -Continue Metoprolol Tartrate 25mg PO BID currently asymptomatic and HD stable  -EKG revealed NSR@68BPM with no acute ST/T wave changes, q waves anteroseptal leads; trop negative x 1  -Outpatient Echocardiogram 5/15/20 revealed LVEF: 66 %. In certain views there appears to be some hypokinesis mid to distal anteroseptum, Mild-mod MR, mild prolapse of the anterior and posterial mitral valve leaflet, Mild TR/MR.  -CCTA 5/18/20 revealed calcium score of 0, normal coronaries, EF:60%  -Holter monitor revealed frequent PVCs (as per patient)  -Plan for Cardiac MRI w/ and w/o contrast today, remain NPO  -EP study Monday AM; possible ICD placement this admission  -Continue Metoprolol Tartrate 25mg PO BID      F: None  E: Replete if K<4 or Mag<2  N: NPO for MRI, DASH diet  VTEppx: Lovenox SQ  Dispo: pending MRI and EPS     Case discussed with Dr. Armstrong currently asymptomatic and HD stable  -EKG revealed NSR@68BPM with no acute ST/T wave changes, q waves anteroseptal leads; trop negative x 1  -Outpatient Echocardiogram 5/15/20 revealed LVEF: 66 %. In certain views there appears to be some hypokinesis mid to distal anteroseptum, Mild-mod MR, mild prolapse of the anterior and posterial mitral valve leaflet, Mild TR/MR.  -CCTA 5/18/20 revealed calcium score of 0, normal coronaries, EF:60%  -Holter monitor revealed frequent PVCs (as per patient)  -Telemetry reviewed with no events, continue to monitor  -Plan for Cardiac MRI w/ and w/o contrast today, remain NPO  -EP study Monday AM; possible ICD placement this admission  -Continue Metoprolol Tartrate 25mg PO BID      F: None  E: Replete if K<4 or Mag<2  N: NPO for MRI, DASH diet  VTEppx: Lovenox SQ  Dispo: pending MRI and EPS     Case discussed with Dr. Armstrong

## 2020-06-21 LAB
ANION GAP SERPL CALC-SCNC: 13 MMOL/L — SIGNIFICANT CHANGE UP (ref 5–17)
BUN SERPL-MCNC: 19 MG/DL — SIGNIFICANT CHANGE UP (ref 7–23)
CALCIUM SERPL-MCNC: 9.6 MG/DL — SIGNIFICANT CHANGE UP (ref 8.4–10.5)
CHLORIDE SERPL-SCNC: 106 MMOL/L — SIGNIFICANT CHANGE UP (ref 96–108)
CO2 SERPL-SCNC: 25 MMOL/L — SIGNIFICANT CHANGE UP (ref 22–31)
CREAT SERPL-MCNC: 0.85 MG/DL — SIGNIFICANT CHANGE UP (ref 0.5–1.3)
GLUCOSE SERPL-MCNC: 101 MG/DL — HIGH (ref 70–99)
HCT VFR BLD CALC: 41.9 % — SIGNIFICANT CHANGE UP (ref 34.5–45)
HGB BLD-MCNC: 13.4 G/DL — SIGNIFICANT CHANGE UP (ref 11.5–15.5)
MAGNESIUM SERPL-MCNC: 1.9 MG/DL — SIGNIFICANT CHANGE UP (ref 1.6–2.6)
MCHC RBC-ENTMCNC: 28.4 PG — SIGNIFICANT CHANGE UP (ref 27–34)
MCHC RBC-ENTMCNC: 32 GM/DL — SIGNIFICANT CHANGE UP (ref 32–36)
MCV RBC AUTO: 88.8 FL — SIGNIFICANT CHANGE UP (ref 80–100)
NRBC # BLD: 0 /100 WBCS — SIGNIFICANT CHANGE UP (ref 0–0)
PLATELET # BLD AUTO: 295 K/UL — SIGNIFICANT CHANGE UP (ref 150–400)
POTASSIUM SERPL-MCNC: 4.6 MMOL/L — SIGNIFICANT CHANGE UP (ref 3.5–5.3)
POTASSIUM SERPL-SCNC: 4.6 MMOL/L — SIGNIFICANT CHANGE UP (ref 3.5–5.3)
RBC # BLD: 4.72 M/UL — SIGNIFICANT CHANGE UP (ref 3.8–5.2)
RBC # FLD: 13.3 % — SIGNIFICANT CHANGE UP (ref 10.3–14.5)
SODIUM SERPL-SCNC: 144 MMOL/L — SIGNIFICANT CHANGE UP (ref 135–145)
TROPONIN T SERPL-MCNC: <0.01 NG/ML — SIGNIFICANT CHANGE UP (ref 0–0.01)
WBC # BLD: 7.7 K/UL — SIGNIFICANT CHANGE UP (ref 3.8–10.5)
WBC # FLD AUTO: 7.7 K/UL — SIGNIFICANT CHANGE UP (ref 3.8–10.5)

## 2020-06-21 PROCEDURE — 99232 SBSQ HOSP IP/OBS MODERATE 35: CPT

## 2020-06-21 RX ORDER — METOPROLOL TARTRATE 50 MG
25 TABLET ORAL ONCE
Refills: 0 | Status: COMPLETED | OUTPATIENT
Start: 2020-06-21 | End: 2020-06-21

## 2020-06-21 RX ORDER — MAGNESIUM OXIDE 400 MG ORAL TABLET 241.3 MG
400 TABLET ORAL ONCE
Refills: 0 | Status: COMPLETED | OUTPATIENT
Start: 2020-06-21 | End: 2020-06-21

## 2020-06-21 RX ADMIN — Medication 25 MILLIGRAM(S): at 18:53

## 2020-06-21 RX ADMIN — MAGNESIUM OXIDE 400 MG ORAL TABLET 400 MILLIGRAM(S): 241.3 TABLET ORAL at 07:50

## 2020-06-21 RX ADMIN — Medication 25 MILLIGRAM(S): at 09:34

## 2020-06-21 NOTE — PROGRESS NOTE ADULT - PROBLEM SELECTOR PLAN 1
Frequent PVCs, couplets overnight, 4 beats NSVT 6/20 PM, currently asymptomatic  - Trop neg x1, f/u repeat CE  - EKG on admit: NSR@68BPM with no acute ST/T wave changes, Q waves anteroseptal leads  - CXR 6/19: Heart size, mediastinal and hilar contours are within normal limits, lung zones clear.  - o/p ECHO 5/15/20: LVEF 66 %, in certain views there appears to be some hypokinesis mid to distal anteroseptum, mild-mod MR, mild prolapse of the anterior and posterior mitral valve leaflet, mild TR/MR.  - CCTA 5/18/20 revealed calcium score 0, normal coronaries, EF 60%  - Holter monitor revealed frequent PVCs (as per patient)  - Underwent Cardiac MRI w/ and w/o contrast 6/20, results pending  - c/w Lopressor 25 mg PO QD  - Continuous telemetry  - NPO after MN for EP study  +/- ICD implant Monday AM. Covid neg x1. Will obtain repeat Covid swab today in anticipation for procedure tomorrow.     F: None  E: Replete if K<4 or Mag<2  N: DASH diet  VTE ppx: Lovenox SQ (last dose 6/21 in anticipation for EPS +/- ICD tomorrow)  Dispo: pending EPS +/- ICD implant 6/22 Frequent PVCs, couplets overnight, 4 beats NSVT 6/20 PM, currently asymptomatic  - Trop neg x1, f/u repeat CE  - EKG on admit: NSR@68BPM with no acute ST/T wave changes, Q waves anteroseptal leads  - CXR 6/19: Heart size, mediastinal and hilar contours are within normal limits, lung zones clear.  - o/p ECHO 5/15/20: LVEF 66 %, in certain views there appears to be some hypokinesis mid to distal anteroseptum, mild-mod MR, mild prolapse of the anterior and posterior mitral valve leaflet, mild TR/MR.  - CCTA 5/18/20 revealed calcium score 0, normal coronaries, EF 60%  - Holter monitor revealed frequent PVCs (as per patient)  - Underwent Cardiac MRI w/ and w/o contrast 6/20, results pending  - c/w Lopressor 25 mg PO QD  - Continuous telemetry  - NPO after MN for EP study  +/- ICD implant Monday AM. Covid neg x1. No need for repeat COVID swab prior to inpatient procedure as per epidemiology.     F: None  E: Replete if K<4 or Mag<2  N: DASH diet  VTE ppx: Lovenox SQ (last dose 6/21 in anticipation for EPS +/- ICD tomorrow)  Dispo: pending EPS +/- ICD implant 6/22

## 2020-06-21 NOTE — PROGRESS NOTE ADULT - SUBJECTIVE AND OBJECTIVE BOX
Interventional Cardiology PA Adult Progress Note    Subjective Assessment:  Pt seen and evaluated at bedside. Feeling fine. Awaiting Cardiac MRI results. Denies CP, SOB, dizziness, diaphoresis, palpitations, N/V, abdominal pain. All other ROS otherwise negative except per subjective.   	  MEDICATIONS:  metoprolol tartrate 25 milliGRAM(s) Oral two times a day  enoxaparin Injectable 40 milliGRAM(s) SubCutaneous daily    [PHYSICAL EXAM:  TELEMETRY:  T(C): 36 (06-21-20 @ 09:10), Max: 36.5 (06-21-20 @ 01:12)  HR: 82 (06-21-20 @ 09:10) (49 - 82)  BP: 108/65 (06-21-20 @ 09:10) (108/65 - 137/70)  RR: 16 (06-21-20 @ 09:10) (15 - 18)  SpO2: 99% (06-21-20 @ 09:10) (96% - 99%)  Wt(kg): --  I&O's Summary    20 Jun 2020 07:01  -  21 Jun 2020 07:00  --------------------------------------------------------  IN: 250 mL / OUT: 200 mL / NET: 50 mL                                  Appearance: WD/WN sitting in hospital bed in NAD	  HEENT:    EOMI	  Neck:  - JVD  Cardiovascular: Normal S1 S2, No murmurs  Respiratory: CTA B/L, no w/r/r  Gastrointestinal:  Soft, Non-tender, + BS	x4  Skin: No rashes, No ecchymoses, No cyanosis  Extremities: Normal range of motion, no c/c/e  Vascular: DP/PT 1+ B/L  Neurologic: Non-focal  Psychiatry: A & O x 3, Mood & affect appropriate                            13.4   7.70  )-----------( 295      ( 21 Jun 2020 05:59 )             41.9     06-21    144  |  106  |  19  ----------------------------<  101<H>  4.6   |  25  |  0.85    Ca    9.6      21 Jun 2020 05:59  Mg     1.9     06-21    TPro  7.7  /  Alb  4.2  /  TBili  0.2  /  DBili  x   /  AST  30  /  ALT  17  /  AlkPhos  122<H>  06-19  PT/INR - ( 19 Jun 2020 19:48 )   PT: 11.5 sec;   INR: 1.01     PTT - ( 19 Jun 2020 19:48 )  PTT:32.7 sec

## 2020-06-21 NOTE — PROGRESS NOTE ADULT - ASSESSMENT
55 yo F with PMHx of MVP, recent suspected Vfib arrest on 4/30 and achieved ROSC in the field, normal LV function on Echo, normal coronaries by CTA, who presents to [facility] ED 6/19/20 for further cardiac work-up and EPS +/- ICD implant 6/22 AM.

## 2020-06-22 LAB
A1C WITH ESTIMATED AVERAGE GLUCOSE RESULT: 5.5 % — SIGNIFICANT CHANGE UP (ref 4–5.6)
ANION GAP SERPL CALC-SCNC: 13 MMOL/L — SIGNIFICANT CHANGE UP (ref 5–17)
BUN SERPL-MCNC: 14 MG/DL — SIGNIFICANT CHANGE UP (ref 7–23)
CALCIUM SERPL-MCNC: 9.4 MG/DL — SIGNIFICANT CHANGE UP (ref 8.4–10.5)
CHLORIDE SERPL-SCNC: 103 MMOL/L — SIGNIFICANT CHANGE UP (ref 96–108)
CO2 SERPL-SCNC: 25 MMOL/L — SIGNIFICANT CHANGE UP (ref 22–31)
CREAT SERPL-MCNC: 0.76 MG/DL — SIGNIFICANT CHANGE UP (ref 0.5–1.3)
ESTIMATED AVERAGE GLUCOSE: 111 MG/DL — SIGNIFICANT CHANGE UP (ref 68–114)
GLUCOSE SERPL-MCNC: 99 MG/DL — SIGNIFICANT CHANGE UP (ref 70–99)
HCT VFR BLD CALC: 39.8 % — SIGNIFICANT CHANGE UP (ref 34.5–45)
HGB BLD-MCNC: 12.9 G/DL — SIGNIFICANT CHANGE UP (ref 11.5–15.5)
MAGNESIUM SERPL-MCNC: 1.9 MG/DL — SIGNIFICANT CHANGE UP (ref 1.6–2.6)
MCHC RBC-ENTMCNC: 28.4 PG — SIGNIFICANT CHANGE UP (ref 27–34)
MCHC RBC-ENTMCNC: 32.4 GM/DL — SIGNIFICANT CHANGE UP (ref 32–36)
MCV RBC AUTO: 87.5 FL — SIGNIFICANT CHANGE UP (ref 80–100)
NRBC # BLD: 0 /100 WBCS — SIGNIFICANT CHANGE UP (ref 0–0)
PLATELET # BLD AUTO: 276 K/UL — SIGNIFICANT CHANGE UP (ref 150–400)
POTASSIUM SERPL-MCNC: 4 MMOL/L — SIGNIFICANT CHANGE UP (ref 3.5–5.3)
POTASSIUM SERPL-SCNC: 4 MMOL/L — SIGNIFICANT CHANGE UP (ref 3.5–5.3)
RBC # BLD: 4.55 M/UL — SIGNIFICANT CHANGE UP (ref 3.8–5.2)
RBC # FLD: 13.2 % — SIGNIFICANT CHANGE UP (ref 10.3–14.5)
SARS-COV-2 RNA SPEC QL NAA+PROBE: SIGNIFICANT CHANGE UP
SODIUM SERPL-SCNC: 141 MMOL/L — SIGNIFICANT CHANGE UP (ref 135–145)
WBC # BLD: 7.11 K/UL — SIGNIFICANT CHANGE UP (ref 3.8–10.5)
WBC # FLD AUTO: 7.11 K/UL — SIGNIFICANT CHANGE UP (ref 3.8–10.5)

## 2020-06-22 PROCEDURE — 99232 SBSQ HOSP IP/OBS MODERATE 35: CPT

## 2020-06-22 PROCEDURE — 93454 CORONARY ARTERY ANGIO S&I: CPT | Mod: 26

## 2020-06-22 RX ORDER — SODIUM CHLORIDE 9 MG/ML
1000 INJECTION INTRAMUSCULAR; INTRAVENOUS; SUBCUTANEOUS
Refills: 0 | Status: DISCONTINUED | OUTPATIENT
Start: 2020-06-22 | End: 2020-06-22

## 2020-06-22 RX ORDER — SODIUM CHLORIDE 9 MG/ML
500 INJECTION INTRAMUSCULAR; INTRAVENOUS; SUBCUTANEOUS
Refills: 0 | Status: DISCONTINUED | OUTPATIENT
Start: 2020-06-22 | End: 2020-06-24

## 2020-06-22 RX ORDER — CHLORHEXIDINE GLUCONATE 213 G/1000ML
1 SOLUTION TOPICAL ONCE
Refills: 0 | Status: DISCONTINUED | OUTPATIENT
Start: 2020-06-22 | End: 2020-06-24

## 2020-06-22 RX ADMIN — Medication 25 MILLIGRAM(S): at 18:03

## 2020-06-22 RX ADMIN — SODIUM CHLORIDE 75 MILLILITER(S): 9 INJECTION INTRAMUSCULAR; INTRAVENOUS; SUBCUTANEOUS at 18:02

## 2020-06-22 RX ADMIN — Medication 25 MILLIGRAM(S): at 07:25

## 2020-06-22 NOTE — PROGRESS NOTE ADULT - ASSESSMENT
55 y/o F with h/o MVP, "tachycardia" on metoprolol at home, who had a syncope episode at home and had a shockable rhythm likely VF arrest by EMS on 6/18.  Has normal LVEF and normal CTA coronaries. Ruled out neurological etiology at OSH.  However, EKG shows Q wave in anterior chest leads.   - d/w Dr. Rees, prior to implanting an ICD for secondary prevention of SCD, he would like to have a diagnostic cardiac cath as pt has this Q wave on v1-v3.   - cMRI still pending today   - continue Metoprolol.   - Likely SQ-ICD tomorrow. Options of SQ-ICD and traditional transvenous ICD were explained to the patient. Given her age and long term need for device and decrease risk for infection, she would benefit better with a SQ-ICD. She has baseline HR 50-60s and is already on Metoprolol.  Unlikely she would need pacing.   - NPO for diagnostic cath today.

## 2020-06-22 NOTE — PROGRESS NOTE ADULT - SUBJECTIVE AND OBJECTIVE BOX
Interventional Cardiology PA Precath Note      HPI:      PMH:    PSH:    ALL: NKDA, NKFA. Denies shellfish/Contrast dye allergy.  SocHX: Denies EtoH/TOB/IVDU  FHx:   MEDS:   metoprolol tartrate 25 milliGRAM(s) Oral two times a day    T(C): 36.2 (06-22-20 @ 10:03), Max: 36.4 (06-21-20 @ 17:36)  HR: 54 (06-22-20 @ 08:54) (51 - 69)  BP: 114/69 (06-22-20 @ 08:54) (106/55 - 138/79)  RR: 16 (06-22-20 @ 08:54) (14 - 18)  SpO2: 98% (06-22-20 @ 08:54) (96% - 99%)  Wt(kg): --  HEENT: NCAT, EOMI, PERRLA  NECK: No JVD, No carotid bruits B/L, +2 Carotid pulses B/L  PULM:  CTA B/L No W/R/R  CARD: RRR, +S1, +S2, No M/R/G  ABD: ND, +BS, NT, no masses  EXT: Warm, pedal edema yes/no, pitting/non-pitting  RECTAL: Guaiac negative/positive   NEURO: A & O x 3, no focal neurologic deficits  PULSES:	B	    R	      FEM         	                                            DP                          PT  Right		                                                  Yes/No     Bruit	    Left		                                                  Yes/No     Bruit                            12.9   7.11  )-----------( 276      ( 22 Jun 2020 06:16 )             39.8     06-22    141  |  103  |  14  ----------------------------<  99  4.0   |  25  |  0.76    Ca    9.4      22 Jun 2020 06:16  Mg     1.9     06-22      CARDIAC MARKERS ( 21 Jun 2020 05:59 )  x     / <0.01 ng/mL / x     / x     / x            EKG:					  ASA _____				Mallampati class: _________	            Anginal Class: _________  A/P:        Sedation Plan:   ? None   ? Moderate    ?  Deep    ?  General Anesthesia   Patient Is Suitable Candidate For Sedation?     ? Yes   ? No   ? Not Applicable     Risks & benefits of procedure and sedation and risks and benefits for the alternative therapy have been explained to the patient in layman’s terms including but not limited to: allergic reaction, bleeding, infection, arrhythmia, respiratory compromise, renal and vascular compromise, limb damage, MI, CVA, emergent CABG/Vascular Surgery and death. Informed consent obtained and in chart. Interventional Cardiology PA Precath Note      HPI:54 yr old F with PMHx of  MVP who initially presented to [facility] 4/30/20 s/p suspected Vfib arrest with successful ROSC in the field. Patient states she was in performing virtual PT for her recovering ankle fracture when she suddenly called out for assistantance. Her , a volunteer with the fire Department responded and initiated CPR. When EMS arrived they attached her to an AED and found her to be in Vfib (rhythm strip not available) for which she was successfully defibrillated. Patient was then taken to [facility] where she was hospitalized, intubated ~24hrs and ultimately discharged 5/4/20. Patient seen by outpatient cardiologist Dr. Carlson for work-up after discharge. Outpatient Echocardiogram 5/15/20 revealed  LVEF: 66 %. In certain views there appears to be some hypokinesis mid to distal anteroseptum.  Mild-to-moderate MR. Mild prolapse of the anterior and posterial mitral valve leaflet.   Mild TR/MR. CTA Coronaries 5/18/20 revealed calcium score of 0, normal coronaries, EF:60%. Holter monitor revealed frequent PVCs. (as per patient)EKG on arrival revealed NSR@68BPM with no acute ST/T wave changes, q waves anteroseptal leads V1-V3 CXR unremarkable. Cardiac enzymes negative x 1 set. Cardiac MRI on 6/20/20 revealed. Left ventricular global systolic function is normal. The LV ejection fraction is 61%. The RV ejection fraction is 44%.  The mitral valve is thickened and myxomatous appearing with bileaflet prolapse. There is mild mitral regurgitation with regurgitation fraction of 23%.No significant abnormalities of the visualized portions of the great vessels. On delayed enhancement imaging, there is no evidence of myocardial scarring. Patient is now referred for cardiac catheterization in light of patient’s recent cardiac arrest, and abnormal EKG findings.     MEDS:   metoprolol tartrate 25 milliGRAM(s) Oral two times a day    T(C): 36.2 (06-22-20 @ 10:03), Max: 36.4 (06-21-20 @ 17:36)  HR: 54 (06-22-20 @ 08:54) (51 - 69)  BP: 114/69 (06-22-20 @ 08:54) (106/55 - 138/79)  RR: 16 (06-22-20 @ 08:54) (14 - 18)  SpO2: 98% (06-22-20 @ 08:54) (96% - 99%)  Wt(kg): --  HEENT: NCAT, EOMI, PERRLA  NECK: No JVD, No carotid bruits B/L, +2 Carotid pulses B/L  PULM:  CTA B/L No W/R/R  CARD: RRR, +S1, +S2, No M/R/G  ABD: ND, +BS, NT, no masses  EXT: Warm, pedal edema yes/no, pitting/non-pitting  RECTAL: Guaiac negative/positive   NEURO: A & O x 3, no focal neurologic deficits  PULSES:	B	    R	      FEM         	                                            DP                          PT  Right		                                                  Yes/No     Bruit	    Left		                                                  Yes/No     Bruit                            12.9   7.11  )-----------( 276      ( 22 Jun 2020 06:16 )             39.8     06-22    141  |  103  |  14  ----------------------------<  99  4.0   |  25  |  0.76    Ca    9.4      22 Jun 2020 06:16  Mg     1.9     06-22      CARDIAC MARKERS ( 21 Jun 2020 05:59 )  x     / <0.01 ng/mL / x     / x     / x            EKG:					  ASA _____				Mallampati class: _________	            Anginal Class: _________  A/P:        Sedation Plan:   ? None   ? Moderate    ?  Deep    ?  General Anesthesia   Patient Is Suitable Candidate For Sedation?     ? Yes   ? No   ? Not Applicable     Risks & benefits of procedure and sedation and risks and benefits for the alternative therapy have been explained to the patient in layman’s terms including but not limited to: allergic reaction, bleeding, infection, arrhythmia, respiratory compromise, renal and vascular compromise, limb damage, MI, CVA, emergent CABG/Vascular Surgery and death. Informed consent obtained and in chart. Interventional Cardiology PA Precath Note      HPI:54 yr old F with PMHx of  MVP who initially presented to [facility] 4/30/20 s/p suspected Vfib arrest with successful ROSC in the field. Patient states she was in performing virtual PT for her recovering ankle fracture when she suddenly called out for assistantance. Her , a volunteer with the fire Department responded and initiated CPR. When EMS arrived they attached her to an AED and found her to be in Vfib (rhythm strip not available) for which she was successfully defibrillated. Patient was then taken to [facility] where she was hospitalized, intubated ~24hrs and ultimately discharged 5/4/20. Patient seen by outpatient cardiologist Dr. Carlson for work-up after discharge. Outpatient Echocardiogram 5/15/20 revealed  LVEF: 66 %. In certain views there appears to be some hypokinesis mid to distal anteroseptum.  Mild-to-moderate MR. Mild prolapse of the anterior and posterial mitral valve leaflet.   Mild TR/MR. CTA Coronaries 5/18/20 revealed calcium score of 0, normal coronaries, EF:60%. Holter monitor revealed frequent PVCs. (as per patient)EKG on arrival revealed NSR@68BPM with no acute ST/T wave changes, q waves anteroseptal leads V1-V3 CXR unremarkable. Cardiac enzymes negative x 1 set. Cardiac MRI on 6/20/20 revealed. Left ventricular global systolic function is normal. The LV ejection fraction is 61%. The RV ejection fraction is 44%.  The mitral valve is thickened and myxomatous appearing with bileaflet prolapse. There is mild mitral regurgitation with regurgitation fraction of 23%.No significant abnormalities of the visualized portions of the great vessels. On delayed enhancement imaging, there is no evidence of myocardial scarring. Patient is now referred for cardiac catheterization in light of patient’s recent cardiac arrest, and abnormal EKG findings.     MEDS:   metoprolol tartrate 25 milliGRAM(s) Oral two times a day    T(C): 36.2 (06-22-20 @ 10:03), Max: 36.4 (06-21-20 @ 17:36)  HR: 54 (06-22-20 @ 08:54) (51 - 69)  BP: 114/69 (06-22-20 @ 08:54) (106/55 - 138/79)  RR: 16 (06-22-20 @ 08:54) (14 - 18)  SpO2: 98% (06-22-20 @ 08:54) (96% - 99%)  Wt(kg): --  HEENT: NCAT, EOMI, PERRLA  PULM:  CTA B/L   CARD: Bradycardic during auscultation no murmur, Tachycardic during pulse exam  ABD: ND, +BS, NT, no masses  EXT: R ankle swollen (hx of fracture with repair)   NEURO: A & O x 3, no focal neurologic deficits  PULSES:    R +2b/l	      FEM +2 b/l no bruits                            DP      +2 b/l                    PT R faint, L +1                        12.9   7.11  )-----------( 276      ( 22 Jun 2020 06:16 )             39.8     06-22    141  |  103  |  14  ----------------------------<  99  4.0   |  25  |  0.76    Ca    9.4      22 Jun 2020 06:16  Mg     1.9     06-22      CARDIAC MARKERS ( 21 Jun 2020 05:59 )  x     / <0.01 ng/mL / x     / x     / x            EKG:					  ASA III			Mallampati class: II	       A/P:    54 yr old F with PMHx of  MVP who has history of recent Vfib arrest with plan for defibrillator placement,  In light of patients recent cardiac arrest and EKG showing Q waves in leads V1-V3 patient is now referred for Cardiac cath with possible intervention.  Cr 0.76  H/H 12.9/39.8    Patient started on IV Fluids at 75 cc/hr  Patient had 30sec run of Bigeminy during exam, asymptomatic laying in bed.  Sedation Plan:  Moderate    Patient Is Suitable Candidate For Sedation?    Yes    Risks & benefits of procedure and sedation and risks and benefits for the alternative therapy have been explained to the patient in layman’s terms including but not limited to: allergic reaction, bleeding, infection, arrhythmia, respiratory compromise, renal and vascular compromise, limb damage, MI, CVA, emergent CABG/Vascular Surgery and death. Informed consent obtained and in chart. Interventional Cardiology PA Precath Note      HPI:54 yr old F with PMHx of  MVP who initially presented to [facility] 4/30/20 s/p suspected Vfib arrest with successful ROSC in the field. Patient states she was in performing virtual PT for her recovering ankle fracture when she suddenly called out for assistantance. Her , a volunteer with the fire Department responded and initiated CPR. When EMS arrived they attached her to an AED and found her to be in Vfib (rhythm strip not available) for which she was successfully defibrillated. Patient was then taken to [facility] where she was hospitalized, intubated ~24hrs and ultimately discharged 5/4/20. Patient seen by outpatient cardiologist Dr. Carlson for work-up after discharge. Outpatient Echocardiogram 5/15/20 revealed  LVEF: 66 %. In certain views there appears to be some hypokinesis mid to distal anteroseptum.  Mild-to-moderate MR. Mild prolapse of the anterior and posterial mitral valve leaflet.   Mild TR/MR. CTA Coronaries 5/18/20 revealed calcium score of 0, normal coronaries, EF:60%. Holter monitor revealed frequent PVCs. (as per patient)EKG on arrival revealed NSR@68BPM with no acute ST/T wave changes, q waves anteroseptal leads V1-V3 CXR unremarkable. Cardiac enzymes negative x 1 set. Cardiac MRI on 6/20/20 revealed. Left ventricular global systolic function is normal. The LV ejection fraction is 61%. The RV ejection fraction is 44%.  The mitral valve is thickened and myxomatous appearing with bileaflet prolapse. There is mild mitral regurgitation with regurgitation fraction of 23%.No significant abnormalities of the visualized portions of the great vessels. On delayed enhancement imaging, there is no evidence of myocardial scarring. Patient is now referred for cardiac catheterization in light of patient’s recent cardiac arrest, and abnormal EKG findings.     MEDS:   metoprolol tartrate 25 milliGRAM(s) Oral two times a day    T(C): 36.2 (06-22-20 @ 10:03), Max: 36.4 (06-21-20 @ 17:36)  HR: 54 (06-22-20 @ 08:54) (51 - 69)  BP: 114/69 (06-22-20 @ 08:54) (106/55 - 138/79)  RR: 16 (06-22-20 @ 08:54) (14 - 18)  SpO2: 98% (06-22-20 @ 08:54) (96% - 99%)  Wt(kg): --  HEENT: NCAT, EOMI, PERRLA  PULM:  CTA B/L   CARD: Bradycardic during auscultation no murmur, Tachycardic during pulse exam  ABD: ND, +BS, NT, no masses  EXT: R ankle swollen (hx of fracture with repair)   NEURO: A & O x 3, no focal neurologic deficits  PULSES:    R +2b/l	      FEM +2 b/l no bruits                            DP      +2 b/l                    PT R faint, L +1                        12.9   7.11  )-----------( 276      ( 22 Jun 2020 06:16 )             39.8     06-22    141  |  103  |  14  ----------------------------<  99  4.0   |  25  |  0.76    Ca    9.4      22 Jun 2020 06:16  Mg     1.9     06-22      CARDIAC MARKERS ( 21 Jun 2020 05:59 )  x     / <0.01 ng/mL / x     / x     / x            EKG:					  ASA III			Mallampati class: II	       A/P:    54 yr old F with PMHx of  MVP who has history of recent Vfib arrest with plan for defibrillator placement,  In light of patients recent cardiac arrest and EKG showing Q waves in leads V1-V3 patient is now referred for Cardiac cath with possible intervention.  Cr 0.76  H/H 12.9/39.8    Patient started on IV Fluids at 75 cc/hr  Plan to load with Aspirin and Plavix on the table if needed    Patient had 30sec run of Bigeminy during exam, asymptomatic laying in bed.  Sedation Plan:  Moderate    Patient Is Suitable Candidate For Sedation?    Yes    Risks & benefits of procedure and sedation and risks and benefits for the alternative therapy have been explained to the patient in layman’s terms including but not limited to: allergic reaction, bleeding, infection, arrhythmia, respiratory compromise, renal and vascular compromise, limb damage, MI, CVA, emergent CABG/Vascular Surgery and death. Informed consent obtained and in chart.

## 2020-06-22 NOTE — PROGRESS NOTE ADULT - SUBJECTIVE AND OBJECTIVE BOX
EPS Progress Note  CC: VF arrest    S: No complaints or overnight events    O: T(C): 36.2 (06-22-20 @ 10:03), Max: 36.4 (06-21-20 @ 13:00)  HR: 54 (06-22-20 @ 08:54) (51 - 69)  BP: 114/69 (06-22-20 @ 08:54) (106/55 - 138/79)  RR: 16 (06-22-20 @ 08:54) (14 - 18)  SpO2: 98% (06-22-20 @ 08:54) (96% - 99%)  Wt(kg): --    TELE: Sinus rhythm HR 50-60. PVC, V-couplets    PHYSICAL  Constitutional:  NAD        Neck: No JVD  Pulm:  CTA B/L, no wheeze or rale   Cardiac:   + s1/s2, RRR  GI:  +BS , soft ND/NT  Vascular: No LE edema, pulse 2+  Neuro: AAO x 3. no focal deficit  Skin: Warm. No rash or lesion     LABS:                        12.9   7.11  )-----------( 276      ( 22 Jun 2020 06:16 )             39.8     06-22    141  |  103  |  14  ----------------------------<  99  4.0   |  25  |  0.76    Ca    9.4      22 Jun 2020 06:16  Mg     1.9     06-22          MEDICATIONS:  metoprolol tartrate 25 milliGRAM(s) Oral two times a day

## 2020-06-22 NOTE — PROGRESS NOTE ADULT - SUBJECTIVE AND OBJECTIVE BOX
Interventional Cardiology Radial band Removal Note    Pt without complaints.  VSS.    Right Radial access site TR Hemoband in place, no hematoma, no bleed  Radial pulse: 2+    Hemostasis achieved with manual release of hemoband.    No Vasovagal reaction.    Meds given: None    Right Radial access site  no hematoma, no bleed  Radial pulse: 2+    A/P:  s/p Dx Coronary Angiogram  -	continue to monitor  -	-OOB as tolerated  -	Post Procedure Instructions given  -                   Call [phone] if any access site issues.

## 2020-06-23 LAB
ANION GAP SERPL CALC-SCNC: 12 MMOL/L — SIGNIFICANT CHANGE UP (ref 5–17)
APTT BLD: 33 SEC — SIGNIFICANT CHANGE UP (ref 27.5–36.3)
BUN SERPL-MCNC: 11 MG/DL — SIGNIFICANT CHANGE UP (ref 7–23)
CALCIUM SERPL-MCNC: 9.6 MG/DL — SIGNIFICANT CHANGE UP (ref 8.4–10.5)
CHLORIDE SERPL-SCNC: 105 MMOL/L — SIGNIFICANT CHANGE UP (ref 96–108)
CO2 SERPL-SCNC: 25 MMOL/L — SIGNIFICANT CHANGE UP (ref 22–31)
CREAT SERPL-MCNC: 0.82 MG/DL — SIGNIFICANT CHANGE UP (ref 0.5–1.3)
GLUCOSE SERPL-MCNC: 97 MG/DL — SIGNIFICANT CHANGE UP (ref 70–99)
HCT VFR BLD CALC: 38.8 % — SIGNIFICANT CHANGE UP (ref 34.5–45)
HGB BLD-MCNC: 12.7 G/DL — SIGNIFICANT CHANGE UP (ref 11.5–15.5)
INR BLD: 0.97 — SIGNIFICANT CHANGE UP (ref 0.88–1.16)
MCHC RBC-ENTMCNC: 28.4 PG — SIGNIFICANT CHANGE UP (ref 27–34)
MCHC RBC-ENTMCNC: 32.7 GM/DL — SIGNIFICANT CHANGE UP (ref 32–36)
MCV RBC AUTO: 86.8 FL — SIGNIFICANT CHANGE UP (ref 80–100)
NRBC # BLD: 0 /100 WBCS — SIGNIFICANT CHANGE UP (ref 0–0)
PLATELET # BLD AUTO: 280 K/UL — SIGNIFICANT CHANGE UP (ref 150–400)
POTASSIUM SERPL-MCNC: 4.3 MMOL/L — SIGNIFICANT CHANGE UP (ref 3.5–5.3)
POTASSIUM SERPL-SCNC: 4.3 MMOL/L — SIGNIFICANT CHANGE UP (ref 3.5–5.3)
PROTHROM AB SERPL-ACNC: 11.1 SEC — SIGNIFICANT CHANGE UP (ref 10–12.9)
RBC # BLD: 4.47 M/UL — SIGNIFICANT CHANGE UP (ref 3.8–5.2)
RBC # FLD: 13.2 % — SIGNIFICANT CHANGE UP (ref 10.3–14.5)
SODIUM SERPL-SCNC: 142 MMOL/L — SIGNIFICANT CHANGE UP (ref 135–145)
WBC # BLD: 7.36 K/UL — SIGNIFICANT CHANGE UP (ref 3.8–10.5)
WBC # FLD AUTO: 7.36 K/UL — SIGNIFICANT CHANGE UP (ref 3.8–10.5)

## 2020-06-23 PROCEDURE — 33249 INSJ/RPLCMT DEFIB W/LEAD(S): CPT

## 2020-06-23 PROCEDURE — 99232 SBSQ HOSP IP/OBS MODERATE 35: CPT

## 2020-06-23 RX ORDER — CEFAZOLIN SODIUM 1 G
2000 VIAL (EA) INJECTION EVERY 8 HOURS
Refills: 0 | Status: COMPLETED | OUTPATIENT
Start: 2020-06-23 | End: 2020-06-23

## 2020-06-23 RX ORDER — ACETAMINOPHEN 500 MG
650 TABLET ORAL EVERY 6 HOURS
Refills: 0 | Status: DISCONTINUED | OUTPATIENT
Start: 2020-06-23 | End: 2020-06-24

## 2020-06-23 RX ORDER — CEFAZOLIN SODIUM 1 G
2000 VIAL (EA) INJECTION ONCE
Refills: 0 | Status: COMPLETED | OUTPATIENT
Start: 2020-06-24 | End: 2020-06-24

## 2020-06-23 RX ORDER — CEFAZOLIN SODIUM 1 G
2000 VIAL (EA) INJECTION ONCE
Refills: 0 | Status: COMPLETED | OUTPATIENT
Start: 2020-06-23 | End: 2020-06-23

## 2020-06-23 RX ORDER — OXYCODONE AND ACETAMINOPHEN 5; 325 MG/1; MG/1
1 TABLET ORAL EVERY 4 HOURS
Refills: 0 | Status: DISCONTINUED | OUTPATIENT
Start: 2020-06-23 | End: 2020-06-24

## 2020-06-23 RX ADMIN — Medication 650 MILLIGRAM(S): at 17:25

## 2020-06-23 RX ADMIN — Medication 25 MILLIGRAM(S): at 17:25

## 2020-06-23 RX ADMIN — Medication 650 MILLIGRAM(S): at 22:30

## 2020-06-23 RX ADMIN — Medication 100 MILLIGRAM(S): at 19:30

## 2020-06-23 RX ADMIN — Medication 100 MILLIGRAM(S): at 11:15

## 2020-06-23 NOTE — PROGRESS NOTE ADULT - SUBJECTIVE AND OBJECTIVE BOX
Pre-op Diagnosis:  Idiopathic VF    Post-op Diagnosis:  Same    Procedure:  Implant of subcutaneous implantable cardioverter defibrillator    Electrophysiologist:  NISHA Rees MD    Assistant:  DEWEY Yusuf MD    Anesthesia:  General anesthesia    Description:  Successful implant of SubQ ICD  Successful DFT    Complications:  None    EBL:  50 cc    Disposition:  Stable    Plan:  - Bedrest x 2 hrs  - Resume current medications

## 2020-06-23 NOTE — PROGRESS NOTE ADULT - REASON FOR ADMISSION
cardiac arrhythmia

## 2020-06-24 ENCOUNTER — TRANSCRIPTION ENCOUNTER (OUTPATIENT)
Age: 55
End: 2020-06-24

## 2020-06-24 VITALS
HEART RATE: 69 BPM | SYSTOLIC BLOOD PRESSURE: 121 MMHG | OXYGEN SATURATION: 99 % | RESPIRATION RATE: 18 BRPM | DIASTOLIC BLOOD PRESSURE: 69 MMHG

## 2020-06-24 PROBLEM — I34.1 NONRHEUMATIC MITRAL (VALVE) PROLAPSE: Chronic | Status: ACTIVE | Noted: 2020-06-19

## 2020-06-24 PROCEDURE — 71046 X-RAY EXAM CHEST 2 VIEWS: CPT | Mod: 26

## 2020-06-24 PROCEDURE — 99239 HOSP IP/OBS DSCHRG MGMT >30: CPT

## 2020-06-24 RX ORDER — ACETAMINOPHEN 500 MG
2 TABLET ORAL
Qty: 0 | Refills: 0 | DISCHARGE
Start: 2020-06-24

## 2020-06-24 RX ORDER — METOPROLOL TARTRATE 50 MG
1 TABLET ORAL
Qty: 30 | Refills: 3
Start: 2020-06-24 | End: 2020-10-21

## 2020-06-24 RX ORDER — METOPROLOL TARTRATE 50 MG
1 TABLET ORAL
Qty: 0 | Refills: 0 | DISCHARGE

## 2020-06-24 RX ADMIN — Medication 650 MILLIGRAM(S): at 11:13

## 2020-06-24 RX ADMIN — Medication 100 MILLIGRAM(S): at 02:44

## 2020-06-24 RX ADMIN — Medication 650 MILLIGRAM(S): at 04:25

## 2020-06-24 RX ADMIN — Medication 25 MILLIGRAM(S): at 06:19

## 2020-06-24 NOTE — DISCHARGE NOTE NURSING/CASE MANAGEMENT/SOCIAL WORK - PATIENT PORTAL LINK FT
You can access the FollowMyHealth Patient Portal offered by [facility] by registering at the following website: [email or web address]. By joining [facility]’s FollowMyHealth portal, you will also be able to view your health information using other applications (apps) compatible with our system.

## 2020-06-24 NOTE — DISCHARGE NOTE PROVIDER - NSDCFUADDINST_GEN_ALL_CORE_FT
No heavy lifting with L arm or strenuous activity for 4 weeks  any bleeding, discharge, hardness or concerns by procedure site or any fever/chills please call [phone]  you may shower on 6/26 but please do not rub the glue off the incision - pat the area dry.  This may take a few weeks to fall off but it should fall off on its own

## 2020-06-24 NOTE — DISCHARGE NOTE PROVIDER - CARE PROVIDER_API CALL
Pernell Rees E  CARDIAC ELECTROPHYSIOLOGY  [address] [place] [address]  Phone: [phone]  Fax: [phone]  Follow Up Time:

## 2020-06-24 NOTE — DISCHARGE NOTE PROVIDER - NSDCFUADDAPPT_GEN_ALL_CORE_FT
Problem: Adult Inpatient Plan of Care  Goal: Plan of Care Review  NAD noted. PT updated on POC. Pt SR on tele. No c/o pain noted. Pt bandages changed. Pt repositioned and encouraged to turn every 2 hours. PO antibiotics given. Blood glucose monitored. SSI administered. PT afebrile during the night. Call light in reach, bed in lowest positioned and bed alarm on.         virtual call with Dr. Rees 7/29/2020 at 10:15

## 2020-06-24 NOTE — DISCHARGE NOTE PROVIDER - HOSPITAL COURSE
54 year old female with MVP who initially presented to [facility] 4/30/20 s/p suspected Vfib arrest with successful ROSC (AED shock) in the field. Patient states she was in performing virtual PT for her recovering ankle fracture when she suddenly called out for assistance. 6/22: cMRI RV 45%. No scar. Cath (b/c of Q wave on V1-3) showed normal coronaries. She ultimately underwent a SQ ICD on 6/23.  She did well overnight.  No chest pain, SOB, syncope or near syncope.  Tele with NSR and PVCs (not long short).  MEdications reconcilled. Dressings removed - no hematoma.  Lungs CTAB.  VSS.  Stable for discharge. 54 year old female with MVP who initially presented to [facility] 4/30/20 s/p suspected Vfib arrest with successful ROSC (AED shock) in the field. Patient states she was in performing virtual PT for her recovering ankle fracture when she suddenly called out for assistance. 6/22: cMRI RV 45%. No scar. Cath (b/c of Q wave on V1-3) showed normal coronaries. She ultimately underwent a SQ ICD on 6/23.  She did well overnight.  No chest pain, SOB, syncope or near syncope.  Tele with NSR and PVCs (not long short).  MEdications reconcilled. Dressings removed - no hematoma.  Lungs CTAB.  VSS.  Stable for discharge.          < from: MR Cardiac w/wo IV Cont (06.20.20 @ 12:58) >        1. The left ventricle (LV) is normal in size. There is no evidence of LV hypertrophy . Left ventricular global systolic function is normal. The LV ejection fraction is 61%    2. The right ventricle (RV) is normal in size. RV global systolic function is mildly reduced. The RV ejection fraction is 44%.      3. The mitral valve is thickened and myxomatous appearing with bileaflet prolapse. There is mild mitral regurgitation with regurgitation fraction of 23%.    4. No significant abnormalities of the visualized portions of the great vessels.    5. On delayed enhancement imaging,  there is no evidence of myocardial scarring.        < end of copied text >

## 2020-06-24 NOTE — DISCHARGE NOTE PROVIDER - NSDCMRMEDTOKEN_GEN_ALL_CORE_FT
Metoprolol Tartrate 25 mg oral tablet: 1 tab(s) orally 2 times a day acetaminophen 325 mg oral tablet: 2 tab(s) orally every 6 hours, As needed, Mild Pain (1 - 3)  Metoprolol Tartrate 25 mg oral tablet: 1 tab(s) orally 2 times a day acetaminophen 325 mg oral tablet: 2 tab(s) orally every 6 hours, As needed, Mild Pain (1 - 3)  Toprol-XL 25 mg oral tablet, extended release: 1 tab(s) orally once a day

## 2020-06-25 PROCEDURE — 80053 COMPREHEN METABOLIC PANEL: CPT

## 2020-06-25 PROCEDURE — 85025 COMPLETE CBC W/AUTO DIFF WBC: CPT

## 2020-06-25 PROCEDURE — 80061 LIPID PANEL: CPT

## 2020-06-25 PROCEDURE — 85027 COMPLETE CBC AUTOMATED: CPT

## 2020-06-25 PROCEDURE — 36415 COLL VENOUS BLD VENIPUNCTURE: CPT

## 2020-06-25 PROCEDURE — 85730 THROMBOPLASTIN TIME PARTIAL: CPT

## 2020-06-25 PROCEDURE — 83036 HEMOGLOBIN GLYCOSYLATED A1C: CPT

## 2020-06-25 PROCEDURE — C1896: CPT

## 2020-06-25 PROCEDURE — 93005 ELECTROCARDIOGRAM TRACING: CPT

## 2020-06-25 PROCEDURE — C1894: CPT

## 2020-06-25 PROCEDURE — 84484 ASSAY OF TROPONIN QUANT: CPT

## 2020-06-25 PROCEDURE — C1722: CPT

## 2020-06-25 PROCEDURE — 71046 X-RAY EXAM CHEST 2 VIEWS: CPT

## 2020-06-25 PROCEDURE — 84443 ASSAY THYROID STIM HORMONE: CPT

## 2020-06-25 PROCEDURE — 99285 EMERGENCY DEPT VISIT HI MDM: CPT | Mod: 25

## 2020-06-25 PROCEDURE — C1769: CPT

## 2020-06-25 PROCEDURE — 71045 X-RAY EXAM CHEST 1 VIEW: CPT

## 2020-06-25 PROCEDURE — 80048 BASIC METABOLIC PNL TOTAL CA: CPT

## 2020-06-25 PROCEDURE — 87635 SARS-COV-2 COVID-19 AMP PRB: CPT

## 2020-06-25 PROCEDURE — 75561 CARDIAC MRI FOR MORPH W/DYE: CPT

## 2020-06-25 PROCEDURE — 83735 ASSAY OF MAGNESIUM: CPT

## 2020-06-25 PROCEDURE — A9577: CPT

## 2020-06-25 PROCEDURE — C1887: CPT

## 2020-06-25 PROCEDURE — 85610 PROTHROMBIN TIME: CPT

## 2020-07-02 ENCOUNTER — APPOINTMENT (OUTPATIENT)
Dept: CARDIOLOGY | Facility: CLINIC | Age: 55
End: 2020-07-02
Payer: COMMERCIAL

## 2020-07-02 ENCOUNTER — NON-APPOINTMENT (OUTPATIENT)
Age: 55
End: 2020-07-02

## 2020-07-02 VITALS
HEIGHT: 65 IN | WEIGHT: 150 LBS | SYSTOLIC BLOOD PRESSURE: 132 MMHG | DIASTOLIC BLOOD PRESSURE: 80 MMHG | BODY MASS INDEX: 24.99 KG/M2

## 2020-07-02 PROCEDURE — 93000 ELECTROCARDIOGRAM COMPLETE: CPT

## 2020-07-02 PROCEDURE — 99214 OFFICE O/P EST MOD 30 MIN: CPT

## 2020-07-02 NOTE — ASSESSMENT
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 I suspect that she has moderate mitral valvular insufficiency with his very prominent murmur. This certainly can induce ventricular tachyarrhythmias. MR does not warrant intervention,\par \par \par \par \par \par Further plans including addressing the mitral valve insufficiency and prolapse with invasive procedures will follow these studies. In addition a consideration for formal electrophysiology will follow as well. All of this was discussed with the patient and her  who was present throughout. All of their questions were answered.

## 2020-07-02 NOTE — HISTORY OF PRESENT ILLNESS
[FreeTextEntry1] : T|Patient returns after ICD implant\par \par The patient is a 54 year-old woman who is well and in her usual state of good health until April 30. She was in the midst of recovering from an ankle fracture, sustained from a fall on the ice in December, 2019. She was doing virtual physical therapy at home when she suddenly call out for assistance. Her , with who was home and who is a volunteer with the fire Department responded. He found her on the floor and summoned 911. When EMS arrived they attach her to a defibrillator and found her to be in ventricular fibrillation. She was successfully defibrillated. She was then taken to [facility] where she was hospitalized through May 4. During that admission the following testing was done:\par #1) CT of the chest: Left lower lobe collapse. No evidence of pulmonary embolism by PE protocol.\par #2) borderline troponins not consistent with acute MI\par #3) echocardiogram--reportedly showing normal LV function. No comment on mitral valve structure.\par The patient was intubated and maintained on a ventilator. She was successfully extubated and treated for pneumonia with possible aspiration. She was discharged from the hospital on May 4 and has been home since.The discharge dx from [facility] was syncope. No evidence of cardiac arrhythmia on monitoring. She saw her primary care physician last week. He referred her to cardiology.\par The patient does carry a diagnosis of mitral valve prolapse. She has known of a heart murmur for many many years.\par Approximately 4 years ago, the patient had an episode of syncope for which she was placed on metoprolol tartrate 5 mg b.i.d. She continued to take metoprolol tartrate all the time of this event but has been off it since.\par ===============================================================================\par Recent Summary and w/u:\par Echo: Normal LV size and function\par           Mild to moderate MVP and mild to moderate MR\par CTA: Normal. Calcium score of 0.  No CAD\par ZIO:  Frequent PVCs and nonsustained AIVR\par She was admitted to [facility], where she had:\par Cardiac Cath--nl\par Cardiac MRI--nl\par =====================================================================================\par The patient has enjoyed good health. No history of hypertension or diabetes or hyperlipidemia. She is a nonsmoker--never.\par =================================================================================\par TESTING, SINCE LAST VISIT:\par 1) Echo: Normal LV size and contractility  EF 66%\par           Mildly thickened MV WITH MILD ANTERIOR LEAFLET PROLAPSE \par           ??Septal hypokinesis--uncertain finding\par 2) CTA: Normal Study\par          No CAD and no anomalous coronary arteries so hours ago mitchel was a\par 3) 2 Week Zio Recording: PENDING\par           \par ===================================================================================\par \par Then, underwent Subcutaneous ICD ([employer] EMBLEM MRI with Dr. Pernell Petersen\par This was a [employer] and one MRI S.-ICD (A219) with a serial #[id number]. This is a single chamber cardioverter defibrillator implanted without difficulty.\par NOW, home after ICD.She is feeling well. No specific complaints. Only medication is Toprol XL.\par

## 2020-07-02 NOTE — PHYSICAL EXAM
[Right Carotid Bruit] : no bruit heard over the right carotid [Left Carotid Bruit] : no bruit heard over the left carotid [Right Femoral Bruit] : no bruit heard over the right femoral artery [Left Femoral Bruit] : no bruit heard over the left femoral artery [Bruit] : no bruit heard [Rt] : no varicose veins of the right leg [Lt] : no varicose veins of the left leg

## 2020-07-06 ENCOUNTER — APPOINTMENT (OUTPATIENT)
Dept: NEUROLOGY | Facility: CLINIC | Age: 55
End: 2020-07-06
Payer: COMMERCIAL

## 2020-07-06 PROCEDURE — 99213 OFFICE O/P EST LOW 20 MIN: CPT | Mod: 95

## 2020-07-07 NOTE — HISTORY OF PRESENT ILLNESS
[Home] : at home, [unfilled] , at the time of the visit. [Verbal consent obtained from patient] : the patient, [unfilled] [Other Location: e.g. Home (Enter Location, City,State)___] : at [unfilled] [FreeTextEntry1] : Patient is doing well overall. She notes no further episodes. Since the last time we spoke she now has a defibrillator. This was placed about 2 weeks ago. We discussed MRI of the brain which was normal. Her routine EEG was abnormal.

## 2020-07-07 NOTE — ASSESSMENT
[FreeTextEntry1] : I would like her to proceed with a ambulatory prolonged video EEG monitoring session. While EEGs can be abnormal after a syncopal event, her EEG was done much later after the fact. If indeed the prolonged video EEG monitoring is abnormal, I am going to obtain a second opinion from one of our [facility] neurologist regarding medication initiation. Further recommendations based on test results.

## 2020-07-10 ENCOUNTER — APPOINTMENT (OUTPATIENT)
Dept: NEUROLOGY | Facility: CLINIC | Age: 55
End: 2020-07-10
Payer: COMMERCIAL

## 2020-07-11 PROCEDURE — 95721 EEG PHY/QHP>36<60 HR W/O VID: CPT

## 2020-07-12 PROCEDURE — 95700 EEG CONT REC W/VID EEG TECH: CPT

## 2020-07-12 PROCEDURE — 95708 EEG WO VID EA 12-26HR UNMNTR: CPT

## 2020-07-13 ENCOUNTER — APPOINTMENT (OUTPATIENT)
Dept: NEUROLOGY | Facility: CLINIC | Age: 55
End: 2020-07-13

## 2020-07-22 ENCOUNTER — APPOINTMENT (OUTPATIENT)
Dept: NEUROLOGY | Facility: CLINIC | Age: 55
End: 2020-07-22

## 2020-07-29 ENCOUNTER — APPOINTMENT (OUTPATIENT)
Dept: HEART AND VASCULAR | Facility: CLINIC | Age: 55
End: 2020-07-29
Payer: COMMERCIAL

## 2020-07-29 PROCEDURE — ZZZZZ: CPT

## 2020-07-29 RX ORDER — METOPROLOL SUCCINATE 25 MG/1
25 TABLET, EXTENDED RELEASE ORAL
Qty: 30 | Refills: 0 | Status: DISCONTINUED | COMMUNITY
Start: 2020-06-24 | End: 2020-07-29

## 2020-07-30 RX ORDER — METOPROLOL TARTRATE 25 MG/1
25 TABLET, FILM COATED ORAL
Qty: 60 | Refills: 0 | Status: DISCONTINUED | COMMUNITY
Start: 2020-06-14 | End: 2020-07-30

## 2020-08-06 ENCOUNTER — APPOINTMENT (OUTPATIENT)
Dept: CARDIOLOGY | Facility: CLINIC | Age: 55
End: 2020-08-06
Payer: COMMERCIAL

## 2020-08-06 VITALS
DIASTOLIC BLOOD PRESSURE: 80 MMHG | WEIGHT: 150 LBS | BODY MASS INDEX: 24.99 KG/M2 | HEIGHT: 65 IN | SYSTOLIC BLOOD PRESSURE: 142 MMHG

## 2020-08-06 PROCEDURE — 99214 OFFICE O/P EST MOD 30 MIN: CPT

## 2020-08-06 NOTE — PHYSICAL EXAM
[Normal Appearance] : normal appearance [General Appearance - Well Developed] : well developed [Well Groomed] : well groomed [No Deformities] : no deformities [General Appearance - Well Nourished] : well nourished [General Appearance - In No Acute Distress] : no acute distress [Normal Conjunctiva] : the conjunctiva exhibited no abnormalities [Eyelids - No Xanthelasma] : the eyelids demonstrated no xanthelasmas [Normal Oral Mucosa] : normal oral mucosa [No Oral Pallor] : no oral pallor [No Oral Cyanosis] : no oral cyanosis [Normal Jugular Venous A Waves Present] : normal jugular venous A waves present [Normal Jugular Venous V Waves Present] : normal jugular venous V waves present [No Jugular Venous Blake A Waves] : no jugular venous blake A waves [Respiration, Rhythm And Depth] : normal respiratory rhythm and effort [Exaggerated Use Of Accessory Muscles For Inspiration] : no accessory muscle use [Auscultation Breath Sounds / Voice Sounds] : lungs were clear to auscultation bilaterally [Abdomen Soft] : soft [Abdomen Tenderness] : non-tender [Abdomen Mass (___ Cm)] : no abdominal mass palpated [Abnormal Walk] : normal gait [Gait - Sufficient For Exercise Testing] : the gait was sufficient for exercise testing [Cyanosis, Localized] : no localized cyanosis [Nail Clubbing] : no clubbing of the fingernails [Petechial Hemorrhages (___cm)] : no petechial hemorrhages [Skin Color & Pigmentation] : normal skin color and pigmentation [No Venous Stasis] : no venous stasis [] : no rash [No Skin Ulcers] : no skin ulcer [Skin Lesions] : no skin lesions [No Xanthoma] : no  xanthoma was observed [Affect] : the affect was normal [Oriented To Time, Place, And Person] : oriented to person, place, and time [No Anxiety] : not feeling anxious [Mood] : the mood was normal [5th Left ICS - MCL] : palpated at the 5th LICS in the midclavicular line [Normal] : normal [Normal Rate] : normal [Rhythm Regular] : regular [Normal S1] : normal S1 [Normal S2] : normal S2 [Click] : a ~M click was heard [No Murmur] : no murmurs heard [III] : a grade 3 [No Abnormalities] : the abdominal aorta was not enlarged and no bruit was heard [No Pitting Edema] : no pitting edema present [Right Carotid Bruit] : no bruit heard over the right carotid [Left Carotid Bruit] : no bruit heard over the left carotid [Right Femoral Bruit] : no bruit heard over the right femoral artery [Left Femoral Bruit] : no bruit heard over the left femoral artery [Bruit] : no bruit heard [Rt] : no varicose veins of the right leg [Lt] : no varicose veins of the left leg

## 2020-08-06 NOTE — HISTORY OF PRESENT ILLNESS
[FreeTextEntry1] : Patient returns after ICD implant\par \par The patient is a 54 year-old woman who is well and in her usual state of good health until April 30. She was in the midst of recovering from an ankle fracture, sustained from a fall on the ice in December, 2019. She was doing virtual physical therapy at home when she suddenly call out for assistance. Her , with who was home and who is a volunteer with the fire Department responded. He found her on the floor and summoned 911. When EMS arrived they attach her to a defibrillator and found her to be in ventricular fibrillation. She was successfully defibrillated. She was then taken to [facility] where she was hospitalized through May 4. During that admission the following testing was done:\par #1) CT of the chest: Left lower lobe collapse. No evidence of pulmonary embolism by PE protocol.\par #2) borderline troponins not consistent with acute MI\par #3) echocardiogram--reportedly showing normal LV function. No comment on mitral valve structure.\par The patient was intubated and maintained on a ventilator. She was successfully extubated and treated for pneumonia with possible aspiration. She was discharged from the hospital on May 4 and has been home since.The discharge dx from [facility] was syncope. No evidence of cardiac arrhythmia on monitoring. She saw her primary care physician last week. He referred her to cardiology.\par The patient does carry a diagnosis of mitral valve prolapse. She has known of a heart murmur for many many years.\par Approximately 4 years ago, the patient had an episode of syncope for which she was placed on metoprolol tartrate 5 mg b.i.d. She continued to take metoprolol tartrate all the time of this event but has been off it since.\par ===============================================================================\par Recent Summary and w/u:\par Echo: Normal LV size and function\par           Mild to moderate MVP and mild to moderate MR\par CTA: Normal. Calcium score of 0.  No CAD\par ZIO:  Frequent PVCs and nonsustained AIVR\par She was admitted to [facility], where she had:\par Cardiac Cath--nl\par Cardiac MRI--nl\par =====================================================================================\par The patient has enjoyed good health. No history of hypertension or diabetes or hyperlipidemia. She is a nonsmoker--never.\par =================================================================================\par Then, on 6/23/20  underwent Subcutaneous ICD ([employer] EMBLEM MRI with Dr. Pernell Petersen. This was a [employer] and one MRI S.-ICD (A219) with a serial #[id number]. This is a single chamber cardioverter defibrillator implanted without difficulty.\par \par NOW: .She is feeling well. No specific complaints. Only medication is Toprol XL.\par

## 2020-10-23 NOTE — ED ADULT TRIAGE NOTE - MODE OF ARRIVAL
LMP: 20  Date of Positive Pregnancy Test: 10.22.20    Is this your first pregnancy? No    If No what pregnancy? Second    History of:  Diabetes? No  Hypertension? No  Miscarriage? No    Are you currently taking prenatal vitamins? Yes and they do contain the DHA supplement.    Date of first OB appt:  Video visit with the nurse: 20 at 7:00am  In office visit with Dr. Sorenson: 12.15.20 at 7:30am     Written Materials: Mail to address on file     Walk in

## 2020-11-05 ENCOUNTER — NON-APPOINTMENT (OUTPATIENT)
Age: 55
End: 2020-11-05

## 2020-11-05 ENCOUNTER — APPOINTMENT (OUTPATIENT)
Dept: CARDIOLOGY | Facility: CLINIC | Age: 55
End: 2020-11-05
Payer: COMMERCIAL

## 2020-11-05 VITALS
BODY MASS INDEX: 24.99 KG/M2 | SYSTOLIC BLOOD PRESSURE: 144 MMHG | HEIGHT: 65 IN | WEIGHT: 150 LBS | DIASTOLIC BLOOD PRESSURE: 80 MMHG

## 2020-11-05 PROCEDURE — 93000 ELECTROCARDIOGRAM COMPLETE: CPT

## 2020-11-05 PROCEDURE — 99214 OFFICE O/P EST MOD 30 MIN: CPT

## 2020-11-05 PROCEDURE — 99072 ADDL SUPL MATRL&STAF TM PHE: CPT

## 2020-11-05 PROCEDURE — 36415 COLL VENOUS BLD VENIPUNCTURE: CPT

## 2020-11-05 NOTE — HISTORY OF PRESENT ILLNESS
[FreeTextEntry1] : Patient returns after ICD implant for SCD, due to MVP and VT\par \par The patient is a 55 year-old woman who is well and in her usual state of good health until April 30, 2020. She was in the midst of recovering from an ankle fracture, sustained from a fall on the ice in December, 2019. She was doing virtual physical therapy at home when she suddenly call out for assistance. Her , with who was home and who is a volunteer with the fire Department responded. He found her on the floor and summoned 911. When EMS arrived they attach her to a defibrillator and found her to be in ventricular fibrillation. She was successfully defibrillated. She was then taken to [facility] where she was hospitalized through May 4. During that admission the following testing was done:\par #1) CT of the chest: Left lower lobe collapse. No evidence of pulmonary embolism by PE protocol.\par #2) borderline troponins not consistent with acute MI\par #3) echocardiogram--reportedly showing normal LV function. No comment on mitral valve structure.\par The patient was intubated and maintained on a ventilator. She was successfully extubated and treated for pneumonia with possible aspiration. She was discharged from the hospital on May 4 and has been home since.The discharge dx from [facility] was syncope. No evidence of cardiac arrhythmia on monitoring. She saw her primary care physician last week. He referred her to cardiology.\par The patient does carry a diagnosis of mitral valve prolapse. She has known of a heart murmur for many many years.\par Approximately 4 years ago, the patient had an episode of syncope for which she was placed on metoprolol tartrate 5 mg b.i.d. She continued to take metoprolol tartrate all the time of this event but has been off it since.\par ===============================================================================\par Recent Summary and w/u:\par Echo: Normal LV size and function\par           Mild to moderate MVP and mild to moderate MR\par CTA: Normal. Calcium score of 0.  No CAD\par ZIO:  Frequent PVCs and nonsustained AIVR\par She was admitted to [facility], where she had:\par Cardiac Cath--nl\par Cardiac MRI--nl\par =====================================================================================\par The patient has enjoyed good health. No history of hypertension or diabetes or hyperlipidemia. She is a nonsmoker--never.\par =================================================================================\par Then, on 6/23/20  underwent Subcutaneous ICD ([employer] EMBLEM MRI with Dr. Pernell Petersen. This was a [employer] and one MRI S.-ICD (A219) with a serial #[id number]. This is a single chamber cardioverter defibrillator implanted without difficulty.\par \par NOW: .She is feeling well. No specific complaints. Only medication is Toprol XL. SHe is having periodic ICD monitoring with Dr. Gimenez--last Sept 23, 2020.\par

## 2020-11-05 NOTE — PHYSICAL EXAM
[General Appearance - Well Developed] : well developed [Normal Appearance] : normal appearance [Well Groomed] : well groomed [General Appearance - Well Nourished] : well nourished [No Deformities] : no deformities [General Appearance - In No Acute Distress] : no acute distress [Normal Conjunctiva] : the conjunctiva exhibited no abnormalities [Eyelids - No Xanthelasma] : the eyelids demonstrated no xanthelasmas [Normal Oral Mucosa] : normal oral mucosa [No Oral Pallor] : no oral pallor [No Oral Cyanosis] : no oral cyanosis [Normal Jugular Venous A Waves Present] : normal jugular venous A waves present [Normal Jugular Venous V Waves Present] : normal jugular venous V waves present [No Jugular Venous Blake A Waves] : no jugular venous blake A waves [Respiration, Rhythm And Depth] : normal respiratory rhythm and effort [Exaggerated Use Of Accessory Muscles For Inspiration] : no accessory muscle use [Auscultation Breath Sounds / Voice Sounds] : lungs were clear to auscultation bilaterally [Abdomen Soft] : soft [Abdomen Tenderness] : non-tender [Abdomen Mass (___ Cm)] : no abdominal mass palpated [Abnormal Walk] : normal gait [Gait - Sufficient For Exercise Testing] : the gait was sufficient for exercise testing [Nail Clubbing] : no clubbing of the fingernails [Cyanosis, Localized] : no localized cyanosis [Petechial Hemorrhages (___cm)] : no petechial hemorrhages [Skin Color & Pigmentation] : normal skin color and pigmentation [] : no rash [No Venous Stasis] : no venous stasis [Skin Lesions] : no skin lesions [No Skin Ulcers] : no skin ulcer [No Xanthoma] : no  xanthoma was observed [Oriented To Time, Place, And Person] : oriented to person, place, and time [Affect] : the affect was normal [Mood] : the mood was normal [No Anxiety] : not feeling anxious [5th Left ICS - MCL] : palpated at the 5th LICS in the midclavicular line [Normal] : normal [Normal Rate] : normal [Rhythm Regular] : regular [Normal S1] : normal S1 [Normal S2] : normal S2 [Click] : a ~M click was heard [No Murmur] : no murmurs heard [III] : a grade 3 [No Abnormalities] : the abdominal aorta was not enlarged and no bruit was heard [No Pitting Edema] : no pitting edema present [Right Carotid Bruit] : no bruit heard over the right carotid [Left Carotid Bruit] : no bruit heard over the left carotid [Right Femoral Bruit] : no bruit heard over the right femoral artery [Left Femoral Bruit] : no bruit heard over the left femoral artery [Bruit] : no bruit heard [Rt] : no varicose veins of the right leg [Lt] : no varicose veins of the left leg

## 2020-11-06 LAB
ALBUMIN SERPL ELPH-MCNC: 5.1 G/DL
ALP BLD-CCNC: 132 U/L
ALT SERPL-CCNC: 14 U/L
ANION GAP SERPL CALC-SCNC: 13 MMOL/L
AST SERPL-CCNC: 27 U/L
BASOPHILS # BLD AUTO: 0.06 K/UL
BASOPHILS NFR BLD AUTO: 0.8 %
BILIRUB SERPL-MCNC: 0.4 MG/DL
BUN SERPL-MCNC: 17 MG/DL
CALCIUM SERPL-MCNC: 10.4 MG/DL
CHLORIDE SERPL-SCNC: 103 MMOL/L
CHOLEST SERPL-MCNC: 210 MG/DL
CO2 SERPL-SCNC: 27 MMOL/L
CREAT SERPL-MCNC: 0.9 MG/DL
EOSINOPHIL # BLD AUTO: 0.22 K/UL
EOSINOPHIL NFR BLD AUTO: 3.1 %
GLUCOSE SERPL-MCNC: 79 MG/DL
HCT VFR BLD CALC: 48.6 %
HDLC SERPL-MCNC: 68 MG/DL
HGB BLD-MCNC: 15.4 G/DL
IMM GRANULOCYTES NFR BLD AUTO: 0.1 %
LDLC SERPL CALC-MCNC: 94 MG/DL
LYMPHOCYTES # BLD AUTO: 2.95 K/UL
LYMPHOCYTES NFR BLD AUTO: 41 %
MAN DIFF?: NORMAL
MCHC RBC-ENTMCNC: 28 PG
MCHC RBC-ENTMCNC: 31.7 GM/DL
MCV RBC AUTO: 88.4 FL
MONOCYTES # BLD AUTO: 0.46 K/UL
MONOCYTES NFR BLD AUTO: 6.4 %
NEUTROPHILS # BLD AUTO: 3.49 K/UL
NEUTROPHILS NFR BLD AUTO: 48.6 %
NONHDLC SERPL-MCNC: 142 MG/DL
PLATELET # BLD AUTO: 317 K/UL
POTASSIUM SERPL-SCNC: 4.9 MMOL/L
PROT SERPL-MCNC: 7.6 G/DL
RBC # BLD: 5.5 M/UL
RBC # FLD: 14.2 %
SODIUM SERPL-SCNC: 143 MMOL/L
TRIGL SERPL-MCNC: 238 MG/DL
WBC # FLD AUTO: 7.19 K/UL

## 2021-01-20 ENCOUNTER — APPOINTMENT (OUTPATIENT)
Dept: HEART AND VASCULAR | Facility: CLINIC | Age: 56
End: 2021-01-20
Payer: COMMERCIAL

## 2021-01-20 PROCEDURE — 99212 OFFICE O/P EST SF 10 MIN: CPT | Mod: 95

## 2021-01-20 NOTE — DISCUSSION/SUMMARY
[FreeTextEntry1] : 55 year old female with history of cardiac arrest due to a shockable rhythm, with a reduced EF, frequent short coupled PVCs and non obstructive CAD s/p SQ ICD 6/2020 who presents for follow up.  She is doing well without any complaints.  Incision well healed.  Interrogation with no arrhythmia and remote monitoring is working. She will continue to utilize remote monitoring and follow up in 1 year or sooner if needed and knows to call with any questions or concerns.

## 2021-01-20 NOTE — REVIEW OF SYSTEMS
[Fever] : no fever [Chills] : no chills [Feeling Fatigued] : not feeling fatigued [Negative] : Heme/Lymph

## 2021-01-20 NOTE — HISTORY OF PRESENT ILLNESS
[de-identified] :  Due to the global COVID-19 pandemic and the recommendations for social distancing, this encounter was performed using the [employer] audio/video platform. All components of the evaluation and management were performed per clinical routine with the exception of the in-person physical exam. If significant physical exam information was provided by the patient or noted by visual inspection on the video portion, it was included in this encounter. Other available physiologic and diagnostic data were reviewed in detail, (e.g. remote monitoring reports, ambulatory vitals, results of prior testing). Verbal consent was obtained before proceeding with this encounter.\par \par 55 year old female with history of cardiac arrest due to a shockable rhythm, with a reduced EF, frequent short coupled PVCs and non obstructive CAD s/p SQ ICD 6/2020 who presents for follow up.\par \par She has no complaints.  NO ICD shocks.  REmote monitoring is working.  NO device relates issues.  No chest pain, syncope, palpitations, near syncope or SOB.

## 2021-01-20 NOTE — PHYSICAL EXAM
[General Appearance - Well Developed] : well developed [Normal Appearance] : normal appearance [Well Groomed] : well groomed [General Appearance - Well Nourished] : well nourished [No Deformities] : no deformities [General Appearance - In No Acute Distress] : no acute distress [Edema] : no peripheral edema present [] : no respiratory distress [Respiration, Rhythm And Depth] : normal respiratory rhythm and effort [Exaggerated Use Of Accessory Muscles For Inspiration] : no accessory muscle use [Clean] : clean [Dry] : dry [Well-Healed] : well-healed [Palpable Crepitus] : no palpable crepitus [Bleeding] : no active bleeding [Foul Odor] : no foul smell [Purulent Drainage] : no purulent drainage [Serosanguineous Drainage] : no serosanquineous drainage [Serous Drainage] : no serous drainage [Erythema] : not erythematous [Warm] : not warm [Tender] : not tender [Indurated] : not indurated [Fluctuant] : not fluctuant

## 2021-02-11 ENCOUNTER — APPOINTMENT (OUTPATIENT)
Dept: CARDIOLOGY | Facility: CLINIC | Age: 56
End: 2021-02-11
Payer: COMMERCIAL

## 2021-02-11 ENCOUNTER — NON-APPOINTMENT (OUTPATIENT)
Age: 56
End: 2021-02-11

## 2021-02-11 VITALS
WEIGHT: 179 LBS | BODY MASS INDEX: 29.82 KG/M2 | DIASTOLIC BLOOD PRESSURE: 70 MMHG | HEIGHT: 65 IN | SYSTOLIC BLOOD PRESSURE: 112 MMHG

## 2021-02-11 PROCEDURE — 93000 ELECTROCARDIOGRAM COMPLETE: CPT

## 2021-02-11 PROCEDURE — 99214 OFFICE O/P EST MOD 30 MIN: CPT

## 2021-02-11 PROCEDURE — 99072 ADDL SUPL MATRL&STAF TM PHE: CPT

## 2021-02-11 NOTE — ASSESSMENT
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 I suspect that she has moderate mitral valvular insufficiency with his very prominent murmur. This certainly can induce ventricular tachyarrhythmias. MR does not warrant intervention,\par \par \par \par

## 2021-02-11 NOTE — HISTORY OF PRESENT ILLNESS
[FreeTextEntry1] : Patient returns after ICD implant for SCD, due to MVP and VT\par \par The patient is a 55 year-old woman who is well and in her usual state of good health until April 30, 2020. She was in the midst of recovering from an ankle fracture, sustained from a fall on the ice in December, 2019. She was doing virtual physical therapy at home when she suddenly call out for assistance. Her , with who was home and who is a volunteer with the fire Department responded. He found her on the floor and summoned 911. When EMS arrived they attach her to a defibrillator and found her to be in ventricular fibrillation. She was successfully defibrillated. She was then taken to [facility] where she was hospitalized through May 4. During that admission the following testing was done:\par #1) CT of the chest: Left lower lobe collapse. No evidence of pulmonary embolism by PE protocol.\par #2) borderline troponins not consistent with acute MI\par #3) echocardiogram--reportedly showing normal LV function. No comment on mitral valve structure.\par The patient was intubated and maintained on a ventilator. She was successfully extubated and treated for pneumonia with possible aspiration. She was discharged from the hospital on May 4 and has been home since.The discharge dx from [facility] was syncope. No evidence of cardiac arrhythmia on monitoring. She saw her primary care physician last week. He referred her to cardiology.\par The patient does carry a diagnosis of mitral valve prolapse. She has known of a heart murmur for many many years.\par Approximately 4 years ago, the patient had an episode of syncope for which she was placed on metoprolol tartrate 5 mg b.i.d. She continued to take metoprolol tartrate all the time of this event but has been off it since.\par ===============================================================================\par Recent Summary and w/u:\par Echo: Normal LV size and function\par           Mild to moderate MVP and mild to moderate MR\par CTA: Normal. Calcium score of 0.  No CAD\par ZIO:  Frequent PVCs and nonsustained AIVR\par She was admitted to [facility], where she had:\par Cardiac Cath--nl\par Cardiac MRI--nl\par =====================================================================================\par The patient has enjoyed good health. No history of hypertension or diabetes or hyperlipidemia. She is a nonsmoker--never.\par =================================================================================\par Then, on 6/23/20  underwent Subcutaneous ICD ([employer] EMBLEM MRI with Dr. Pernell Petersen. This was a [employer] and one MRI S.-ICD (A219) with a serial #[id number]. This is a single chamber cardioverter defibrillator implanted without difficulty.\par \par NOW: .She is feeling well. No specific complaints. Only medication is Toprol XL. She is having periodic ICD monitoring with Dr. Gimenez--last Jan 20, 2021\par \par EKG today--(2/11/2021):Sinus  Bradycardia \par nl axis\par delayed R-wave progression again noted\par NSC\par

## 2021-02-12 LAB
ALBUMIN SERPL ELPH-MCNC: 5 G/DL
ALP BLD-CCNC: 141 U/L
ALT SERPL-CCNC: 15 U/L
ANION GAP SERPL CALC-SCNC: 17 MMOL/L
AST SERPL-CCNC: 29 U/L
BASOPHILS # BLD AUTO: 0.06 K/UL
BASOPHILS NFR BLD AUTO: 0.7 %
BILIRUB SERPL-MCNC: 0.2 MG/DL
BUN SERPL-MCNC: 19 MG/DL
CALCIUM SERPL-MCNC: 10.7 MG/DL
CHLORIDE SERPL-SCNC: 99 MMOL/L
CHOLEST SERPL-MCNC: 219 MG/DL
CO2 SERPL-SCNC: 25 MMOL/L
CREAT SERPL-MCNC: 1.11 MG/DL
EOSINOPHIL # BLD AUTO: 0.25 K/UL
EOSINOPHIL NFR BLD AUTO: 3.1 %
GLUCOSE SERPL-MCNC: 87 MG/DL
HCT VFR BLD CALC: 50.7 %
HDLC SERPL-MCNC: 67 MG/DL
HGB BLD-MCNC: 15.6 G/DL
IMM GRANULOCYTES NFR BLD AUTO: 0.1 %
LDLC SERPL CALC-MCNC: 103 MG/DL
LYMPHOCYTES # BLD AUTO: 2.87 K/UL
LYMPHOCYTES NFR BLD AUTO: 35.4 %
MAN DIFF?: NORMAL
MCHC RBC-ENTMCNC: 28.2 PG
MCHC RBC-ENTMCNC: 30.8 GM/DL
MCV RBC AUTO: 91.7 FL
MONOCYTES # BLD AUTO: 0.64 K/UL
MONOCYTES NFR BLD AUTO: 7.9 %
NEUTROPHILS # BLD AUTO: 4.27 K/UL
NEUTROPHILS NFR BLD AUTO: 52.8 %
NONHDLC SERPL-MCNC: 152 MG/DL
PLATELET # BLD AUTO: 340 K/UL
POTASSIUM SERPL-SCNC: 4.8 MMOL/L
PROT SERPL-MCNC: 7.9 G/DL
RBC # BLD: 5.53 M/UL
RBC # FLD: 13.4 %
SODIUM SERPL-SCNC: 141 MMOL/L
TRIGL SERPL-MCNC: 246 MG/DL
WBC # FLD AUTO: 8.1 K/UL

## 2021-02-25 ENCOUNTER — RESULT REVIEW (OUTPATIENT)
Age: 56
End: 2021-02-25

## 2021-02-25 ENCOUNTER — APPOINTMENT (OUTPATIENT)
Dept: HEMATOLOGY ONCOLOGY | Facility: CLINIC | Age: 56
End: 2021-02-25
Payer: COMMERCIAL

## 2021-02-25 VITALS
OXYGEN SATURATION: 99 % | WEIGHT: 178 LBS | HEART RATE: 81 BPM | RESPIRATION RATE: 16 BRPM | SYSTOLIC BLOOD PRESSURE: 156 MMHG | DIASTOLIC BLOOD PRESSURE: 78 MMHG | HEIGHT: 66 IN | TEMPERATURE: 97.7 F | BODY MASS INDEX: 28.61 KG/M2

## 2021-02-25 PROCEDURE — 99072 ADDL SUPL MATRL&STAF TM PHE: CPT

## 2021-02-25 PROCEDURE — 99205 OFFICE O/P NEW HI 60 MIN: CPT

## 2021-02-25 RX ORDER — VALACYCLOVIR HYDROCHLORIDE 1 G/1
1 TABLET, FILM COATED ORAL
Refills: 0 | Status: DISCONTINUED | COMMUNITY
End: 2021-02-25

## 2021-02-25 NOTE — ASSESSMENT
[FreeTextEntry1] : Erythrocytosis\par Had syncope s/p ICD implant for SCD, due to MVP and VT\par Cardiac Cath--nl\par Cardiac MRI--nl\par Had normal RBC, HGb prior\par No family ho sudden cardiac death at early age, DVT or PE\par She had first trimester . No pregnancies\par Was on birth control pills for most part of her life and never had blood clot\par No CVA\par \par Explained at length about the differential diagnosis- primary (P Vera) vs secondary (hypoxia, smoking, CITLALLI, renal/liver tumors, pheochromocytoma, etc)\par Send blood work including epo, REBECA.CLAR/MPL mutations. Encouraged her to consider sleep studies for CITLALLI\par Also send thrombophilia work up, [facility] \par \par Patient had multiple questions which were answered to satisfaction\par \par Follow up in 3 weeks\par No labs\par

## 2021-02-25 NOTE — HISTORY OF PRESENT ILLNESS
[de-identified] : Ms. Shanon Teixeira is 55 year old female with history of erythrocytosis, referred by Dr. Josr Carlson\par \par Patient with history of mitral valve prolapse, ventricular fibrillation who was on bedrest from Jan 2020 to March 2020 from right ankle fracture, started PT and then had cardiac arrest at home in April 2020 during one of her PT session s/p CPR with successful resuscitation and subcutaneous defibrillator placement in June 2020 with Dr. Josr Carlson and Dr. Jimmie Rees\par \par 2/12/21 wbc 8.10 Hgb 15.6 hct 50.7 mcv 91.7 plts 340 RBC 5.53 Ca 10.7 Alk phos - 141 \par 11/5/20 Hgb 15.4 hct 48.6 MCV 88.4 Ca - 10.4 alk phos  - 132\par Hgb/Hct normalized in July 2020\par \par SHx: never smoked\par No family history of hematological and/or oncological. \par Post menopausal since 2016.\par Patient had osteopenia on foot 15 yrs ago, on Fosamax x 2 years and now osteopenia on wrist - last Dexa was Oct 2020. She  has been taking Ca and

## 2021-03-18 ENCOUNTER — APPOINTMENT (OUTPATIENT)
Dept: HEMATOLOGY ONCOLOGY | Facility: CLINIC | Age: 56
End: 2021-03-18
Payer: COMMERCIAL

## 2021-03-18 VITALS
OXYGEN SATURATION: 97 % | HEIGHT: 66 IN | WEIGHT: 180 LBS | BODY MASS INDEX: 28.93 KG/M2 | SYSTOLIC BLOOD PRESSURE: 157 MMHG | DIASTOLIC BLOOD PRESSURE: 80 MMHG | RESPIRATION RATE: 18 BRPM | HEART RATE: 70 BPM | TEMPERATURE: 97.5 F

## 2021-03-18 PROCEDURE — 99072 ADDL SUPL MATRL&STAF TM PHE: CPT

## 2021-03-18 PROCEDURE — 99214 OFFICE O/P EST MOD 30 MIN: CPT

## 2021-03-18 NOTE — ASSESSMENT
[FreeTextEntry1] : Erythrocytosis\par Had syncope s/p ICD implant for SCD, due to MVP and VT\par Cardiac Cath--nl\par Cardiac MRI--nl\par Had normal RBC, HGb prior\par No family ho sudden cardiac death at early age, DVT or PE\par She had first trimester . No pregnancies\par Was on birth control pills for most part of her life and never had blood clot\par No CVA\par \par Normal epo with negative REBECA/CALR/MPL- rules out P Vera\par Her H/H is borderline high does not meet the criteria for PV\par Thrombophilia work up largely negative except for borderline IgM B2 glycoprotein AB and anticardiolipin AB screen\par Repeat in 3 months\par Encouraged to have sleep studies to rule out CITLALLI\par Refer to pulm\par \par She is relocating to [facility]\par \par Patient had multiple questions which were answered to satisfaction\par \par Follow up in 3 months\par CBC, CMP, iron studies, ferritin, lupus anticoagulant panel, IgM and IgG B2 glycoprotein AB and IgG and IgM anticardiolipin AB\par

## 2021-03-18 NOTE — RESULTS/DATA
[FreeTextEntry1] : Labs reviewed, analyzed and discussed\par \par Factor V Leiden mutation, Prothrombin Gene mutation, Protein S, AT3- negative\par Lupus AC panel- negative\par Anticardiolipin AB screen- positive\par IgM B2 glycoprotein Ab - 20.6 (slightly elevated)\par \par TIBC 406\par Ferritin 87\par \par Normal epo \par Negative REBECA/CALR/MPL\par

## 2021-03-18 NOTE — HISTORY OF PRESENT ILLNESS
[de-identified] : Ms. Shanon Teixeira is 55 year old female with history of erythrocytosis, referred by Dr. Josr Carlson\par \par Patient with history of mitral valve prolapse, ventricular fibrillation who was on bedrest from Jan 2020 to March 2020 from right ankle fracture, started PT and then had cardiac arrest at home in April 2020 during one of her PT session s/p CPR with successful resuscitation and subcutaneous defibrillator placement in June 2020 with Dr. Josr Carlson and Dr. Jimmie Rees\par \par 2/12/21 wbc 8.10 Hgb 15.6 hct 50.7 mcv 91.7 plts 340 RBC 5.53 Ca 10.7 Alk phos - 141 \par 11/5/20 Hgb 15.4 hct 48.6 MCV 88.4 Ca - 10.4 alk phos  - 132\par Hgb/Hct normalized in July 2020\par \par SHx: never smoked\par No family history of hematological and/or oncological. \par Post menopausal since 2016.\par Patient had osteopenia on foot 15 yrs ago, on Fosamax x 2 years and now osteopenia on wrist - last Dexa was Oct 2020. She  has been taking Ca and  [de-identified] : She is seen today for follow up\par \par No new symptoms\par

## 2021-03-24 ENCOUNTER — NON-APPOINTMENT (OUTPATIENT)
Age: 56
End: 2021-03-24

## 2021-03-24 ENCOUNTER — APPOINTMENT (OUTPATIENT)
Dept: HEART AND VASCULAR | Facility: CLINIC | Age: 56
End: 2021-03-24
Payer: COMMERCIAL

## 2021-03-24 PROCEDURE — 93295 DEV INTERROG REMOTE 1/2/MLT: CPT

## 2021-03-24 PROCEDURE — 93296 REM INTERROG EVL PM/IDS: CPT

## 2021-04-08 ENCOUNTER — APPOINTMENT (OUTPATIENT)
Dept: PULMONOLOGY | Facility: CLINIC | Age: 56
End: 2021-04-08
Payer: COMMERCIAL

## 2021-04-08 VITALS
BODY MASS INDEX: 28.61 KG/M2 | DIASTOLIC BLOOD PRESSURE: 72 MMHG | OXYGEN SATURATION: 99 % | SYSTOLIC BLOOD PRESSURE: 130 MMHG | HEART RATE: 74 BPM | WEIGHT: 178 LBS | TEMPERATURE: 97 F | HEIGHT: 66 IN

## 2021-04-08 DIAGNOSIS — G47.33 OBSTRUCTIVE SLEEP APNEA (ADULT) (PEDIATRIC): ICD-10-CM

## 2021-04-08 PROCEDURE — 99072 ADDL SUPL MATRL&STAF TM PHE: CPT

## 2021-04-08 PROCEDURE — 99204 OFFICE O/P NEW MOD 45 MIN: CPT

## 2021-04-08 NOTE — HISTORY OF PRESENT ILLNESS
[FreeTextEntry1] : 55 year old female with MVP, Hx of VF, cardiac arrest at home, s/p defibrillator placement referred for evaluation of sleep disorder breathing. \par She was following with Cardiology who noticed erythrocytosis.\par She was seen by Dr Castro who referred her here for evaluation of CITLALLI.\par She snores at night. No observed pauses. Sleeps well at night.\par No daytime somnolence. Takes naps over the weekend.\par No morning HA.\par Mild nocturia.\par No creepy crawling sensation legs\par BT 11 pm\par SLT 10- 15 min\par WT 7 am\par She gained weight 30 lbs over past year\par \par New CXR: my read: no infiltrates, no effusions. Defibrillator\par CT heart, my read: no nodules, no masses, no adenopathy\par BMP: CO2 normal at 26.\par Echo: PA pressure 25\par Mitral regurgitation\par Normal Rt ventricle and Rt atrium\par \par PMHX: as above and HTN\par \par SHX: never smoked\par FHX: denies

## 2021-04-08 NOTE — REVIEW OF SYSTEMS
[EDS: ESS=____] : daytime somnolence: ESS=[unfilled] [Recent Wt Gain (___ Lbs)] : recent [unfilled] ~Ulb weight gain [Nocturia] : nocturia [Negative] : Psychiatric [Difficulty Initiating Sleep] : no difficulty falling asleep [Difficulty Maintaining Sleep] : no difficulty maintaining sleep [Acute Insomnia] : no acute insomnia [Chronic Insomnia] : no chronic  insomnia [Lower Extremity Discomfort] : no lower extremity discomfort [Irresistible urge to move legs] : no irresistible urge to move legs because of lower extremity discomfort [LE discomfort relieved by movement] : lower extremity discomfort not relieved by movement [Late day/ Evening symptoms] : no late day/evening symptoms [Sleep Disturbances due to LE symptoms] : ~T no sleep disturbances due to lower extremity symptoms [Unusual Sleep Behavior] : no unusual sleep behavior [Hypersomnolence] : not sleeping much more than usual [Cataplexy] :  no cataplexy [Hypnogogic Hallucinations] : no hypnogogic hallucinations [Hypnopompic Hallucinations] : no hypnopompic hallucinations [Sleep Paralysis] : no sleep paralysis [FreeTextEntry8] : see HPI

## 2021-04-08 NOTE — PHYSICAL EXAM
[General Appearance - Well Developed] : well developed [Normal Appearance] : normal appearance [General Appearance - Well Nourished] : well nourished [Normal Conjunctiva] : the conjunctiva exhibited no abnormalities [III] : III [Neck Appearance] : the appearance of the neck was normal [Neck Circumference: ___] : neck circumference is [unfilled] [Apical Impulse] : the apical impulse was normal [Heart Rate And Rhythm] : heart rate was normal and rhythm regular [Heart Sounds] : normal S1 and S2 [] : no respiratory distress [Respiration, Rhythm And Depth] : normal respiratory rhythm and effort [Exaggerated Use Of Accessory Muscles For Inspiration] : no accessory muscle use [Auscultation Breath Sounds / Voice Sounds] : lungs were clear to auscultation bilaterally [Abnormal Walk] : normal gait [Nail Clubbing] : no clubbing  or cyanosis of the fingernails [Skin Turgor] : normal skin turgor [No Focal Deficits] : no focal deficits [Oriented To Time, Place, And Person] : oriented to person, place, and time [Impaired Insight] : insight and judgment were intact

## 2021-04-14 ENCOUNTER — NON-APPOINTMENT (OUTPATIENT)
Age: 56
End: 2021-04-14

## 2021-04-14 ENCOUNTER — APPOINTMENT (OUTPATIENT)
Dept: GASTROENTEROLOGY | Facility: CLINIC | Age: 56
End: 2021-04-14
Payer: COMMERCIAL

## 2021-04-14 VITALS
BODY MASS INDEX: 28.61 KG/M2 | TEMPERATURE: 97.1 F | OXYGEN SATURATION: 98 % | WEIGHT: 178 LBS | HEART RATE: 66 BPM | HEIGHT: 66 IN | DIASTOLIC BLOOD PRESSURE: 72 MMHG | RESPIRATION RATE: 18 BRPM | SYSTOLIC BLOOD PRESSURE: 126 MMHG

## 2021-04-14 DIAGNOSIS — Z12.11 ENCOUNTER FOR SCREENING FOR MALIGNANT NEOPLASM OF COLON: ICD-10-CM

## 2021-04-14 PROCEDURE — 99072 ADDL SUPL MATRL&STAF TM PHE: CPT

## 2021-04-14 PROCEDURE — S0285 CNSLT BEFORE SCREEN COLONOSC: CPT

## 2021-04-19 ENCOUNTER — RESULT REVIEW (OUTPATIENT)
Age: 56
End: 2021-04-19

## 2021-04-20 ENCOUNTER — APPOINTMENT (OUTPATIENT)
Dept: GASTROENTEROLOGY | Facility: CLINIC | Age: 56
End: 2021-04-20
Payer: COMMERCIAL

## 2021-04-20 PROCEDURE — 36415 COLL VENOUS BLD VENIPUNCTURE: CPT

## 2021-04-20 PROCEDURE — 99072 ADDL SUPL MATRL&STAF TM PHE: CPT

## 2021-04-23 DIAGNOSIS — R74.8 ABNORMAL LEVELS OF OTHER SERUM ENZYMES: ICD-10-CM

## 2021-04-23 NOTE — HISTORY OF PRESENT ILLNESS
[FreeTextEntry1] : 55 year old F PMH ankle injury/ed rest 01/2020-03/2020, htn, vfib arrest 04/2020 during PT session, s/p ICD 06/2020, no CAD/CMP, MVP, ? antiphopholipid antibody syndrome, osteopenia, borderline elevated hemoglobin presents for evaluation . \par \par Last colonoscopy with Dr. Escobedo 1/31/2013 with Dr. Escobedo internal hemorrhoids- no polyps, recall for colonoscopy in 7-10 years. \par \par she will be scheduled for sleep study with Dr. Radha landaverde for evaluation of CITLALLI\par \par Patient denies abdominal pain , n/v/heartburn, reflux, dysphagia/odynophagia, change in bowel habits, diarrhea, constipation, brbpr, melena. no weight loss.  Good appetite and energy level. Patient has daily BM, denies regular NSAID use. \par \par 2/21- H/H-14.7/45.6, TIBC 406, Iron 56, % sat 14, UIBC 350\par alk phos 139, AST 27, ALT 16\par \par she is moving to [place] soon. 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 Villegas

## 2021-04-23 NOTE — ASSESSMENT
[FreeTextEntry1] : Elevated hemoglobin- check hemochromatosis gene test\par \par Elevated alk phos- labs as listed, Abd US\par \par Colon cancer screening- \par \par Risks (including but not limited to sedation risks, infection, bleeding, perforation, possibility of missed lesions), benefits and alternatives to procedure, including not doing the procedure, were discussed with patient. Patient understood and elected for Cologuard stool test.  She understands that if Cologuard is positive a colonoscopy will be recommended. \par I spoke with her cardiologist, Dr. Josr Carlson, and there are no cardiac contraindications to colonoscopy if it is needed. \par

## 2021-04-23 NOTE — ADDENDUM
[FreeTextEntry1] : detailed message left for patient on 4/23- abd US with likely benign hemangiomas, recommend CT Abdomen with IV contrast for further evaluation.

## 2021-04-23 NOTE — PHYSICAL EXAM
[General Appearance - Alert] : alert [General Appearance - In No Acute Distress] : in no acute distress [Sclera] : the sclera and conjunctiva were normal [Outer Ear] : the ears and nose were normal in appearance [Neck Appearance] : the appearance of the neck was normal [] : no respiratory distress [Abdomen Soft] : soft [Apical Impulse] : the apical impulse was normal [Abdomen Tenderness] : non-tender [Abnormal Walk] : normal gait [Skin Color & Pigmentation] : normal skin color and pigmentation [Oriented To Time, Place, And Person] : oriented to person, place, and time [FreeTextEntry1] : deferred to colonoscopy

## 2021-04-23 NOTE — REASON FOR VISIT
[Consultation] : a consultation visit [FreeTextEntry1] : Patient seen at the request of Dr. Josr Carlson for the evaluation of elevated hemoglobin and colon cancer screening

## 2021-05-13 ENCOUNTER — APPOINTMENT (OUTPATIENT)
Dept: CARDIOLOGY | Facility: CLINIC | Age: 56
End: 2021-05-13
Payer: COMMERCIAL

## 2021-05-13 ENCOUNTER — NON-APPOINTMENT (OUTPATIENT)
Age: 56
End: 2021-05-13

## 2021-05-13 VITALS
SYSTOLIC BLOOD PRESSURE: 132 MMHG | DIASTOLIC BLOOD PRESSURE: 72 MMHG | HEIGHT: 66 IN | WEIGHT: 178 LBS | BODY MASS INDEX: 28.61 KG/M2

## 2021-05-13 PROCEDURE — 93000 ELECTROCARDIOGRAM COMPLETE: CPT

## 2021-05-13 PROCEDURE — 99072 ADDL SUPL MATRL&STAF TM PHE: CPT

## 2021-05-13 PROCEDURE — 99214 OFFICE O/P EST MOD 30 MIN: CPT

## 2021-05-13 PROCEDURE — 36415 COLL VENOUS BLD VENIPUNCTURE: CPT

## 2021-05-13 NOTE — HISTORY OF PRESENT ILLNESS
[FreeTextEntry1] : Patient returns after ICD implant for SCD, due to MVP and VT\par \par The patient is a 55 year-old woman who is well and in her usual state of good health until April 30, 2020. She was in the midst of recovering from an ankle fracture, sustained from a fall on the ice in December, 2019. She was doing virtual physical therapy at home when she suddenly call out for assistance. Her , with who was home and who is a volunteer with the fire Department responded. He found her on the floor and summoned 911. When EMS arrived they attach her to a defibrillator and found her to be in ventricular fibrillation. She was successfully defibrillated. She was then taken to [facility] where she was hospitalized through May 4. During that admission the following testing was done:\par #1) CT of the chest: Left lower lobe collapse. No evidence of pulmonary embolism by PE protocol.\par #2) borderline troponins not consistent with acute MI\par #3) echocardiogram--reportedly showing normal LV function. No comment on mitral valve structure.\par The patient was intubated and maintained on a ventilator. She was successfully extubated and treated for pneumonia with possible aspiration. She was discharged from the hospital on May 4 and has been home since.The discharge dx from [facility] was syncope. No evidence of cardiac arrhythmia on monitoring. She saw her primary care physician last week. He referred her to cardiology.\par The patient does carry a diagnosis of mitral valve prolapse. She has known of a heart murmur for many many years.\par Approximately 4 years ago, the patient had an episode of syncope for which she was placed on metoprolol tartrate 5 mg b.i.d. She continued to take metoprolol tartrate all the time of this event but has been off it since.\par ===============================================================================\par Recent Summary and w/u:\par Echo: Normal LV size and function\par           Mild to moderate MVP and mild to moderate MR\par CTA: Normal. Calcium score of 0.  No CAD\par ZIO:  Frequent PVCs and nonsustained AIVR\par She was admitted to [facility], where she had:\par Cardiac Cath--nl\par Cardiac MRI--nl\par =====================================================================================\par The patient has enjoyed good health. No history of hypertension or diabetes or hyperlipidemia. She is a nonsmoker--never.\par =================================================================================\par Then, on 6/23/20  underwent Subcutaneous ICD ([employer] EMBLEM MRI with Dr. Pernell Petersen. This was a [employer] and one MRI S.-ICD (A219) with a serial #[id number]. This is a single chamber cardioverter defibrillator implanted without difficulty.\par ===========================================================================================\par NOW: She has undergone an extensive workup with Dr. Medina of hematology for polycythemia. She is also seeing Dr. Mills of gastroenterology. Thus far, workup for lupus anticoagulant, factor V Leyden, hemochromatosis, and serum protein electrophoresis are all normal. No abnormalities of any testing have been identified thus far.\par \par \par She is feeling well. No specific complaints. Only medication is Toprol XL. She is having periodic ICD monitoring with Dr. Gimenez.\par \par EKG today--(5/13/2021):Sinus  Bradycardia \par nl axis\par delayed R-wave progression again noted (Septal Q-waves V1--V3)\par NSC\par

## 2021-05-14 LAB
ALBUMIN SERPL ELPH-MCNC: 4.7 G/DL
ALP BLD-CCNC: 137 U/L
ALT SERPL-CCNC: 15 U/L
ANION GAP SERPL CALC-SCNC: 12 MMOL/L
AST SERPL-CCNC: 27 U/L
BASOPHILS # BLD AUTO: 0.04 K/UL
BASOPHILS NFR BLD AUTO: 0.6 %
BILIRUB SERPL-MCNC: 0.4 MG/DL
BUN SERPL-MCNC: 21 MG/DL
CALCIUM SERPL-MCNC: 10.2 MG/DL
CHLORIDE SERPL-SCNC: 103 MMOL/L
CHOLEST SERPL-MCNC: 211 MG/DL
CO2 SERPL-SCNC: 24 MMOL/L
CREAT SERPL-MCNC: 0.84 MG/DL
EOSINOPHIL # BLD AUTO: 0.11 K/UL
EOSINOPHIL NFR BLD AUTO: 1.5 %
GLUCOSE SERPL-MCNC: 92 MG/DL
HCT VFR BLD CALC: 47 %
HDLC SERPL-MCNC: 67 MG/DL
HGB BLD-MCNC: 14.7 G/DL
IMM GRANULOCYTES NFR BLD AUTO: 0.1 %
LDLC SERPL CALC-MCNC: 110 MG/DL
LYMPHOCYTES # BLD AUTO: 2.58 K/UL
LYMPHOCYTES NFR BLD AUTO: 35.6 %
MAN DIFF?: NORMAL
MCHC RBC-ENTMCNC: 27.7 PG
MCHC RBC-ENTMCNC: 31.3 GM/DL
MCV RBC AUTO: 88.5 FL
MONOCYTES # BLD AUTO: 0.58 K/UL
MONOCYTES NFR BLD AUTO: 8 %
NEUTROPHILS # BLD AUTO: 3.92 K/UL
NEUTROPHILS NFR BLD AUTO: 54.2 %
NONHDLC SERPL-MCNC: 145 MG/DL
PLATELET # BLD AUTO: 323 K/UL
POTASSIUM SERPL-SCNC: 4.4 MMOL/L
PROT SERPL-MCNC: 7.5 G/DL
RBC # BLD: 5.31 M/UL
RBC # FLD: 13.1 %
SODIUM SERPL-SCNC: 140 MMOL/L
TRIGL SERPL-MCNC: 173 MG/DL
WBC # FLD AUTO: 7.24 K/UL

## 2021-05-17 ENCOUNTER — RESULT REVIEW (OUTPATIENT)
Age: 56
End: 2021-05-17

## 2021-06-08 ENCOUNTER — APPOINTMENT (OUTPATIENT)
Dept: PULMONOLOGY | Facility: CLINIC | Age: 56
End: 2021-06-08

## 2021-06-11 ENCOUNTER — RESULT REVIEW (OUTPATIENT)
Age: 56
End: 2021-06-11

## 2021-06-11 ENCOUNTER — APPOINTMENT (OUTPATIENT)
Dept: HEMATOLOGY ONCOLOGY | Facility: CLINIC | Age: 56
End: 2021-06-11

## 2021-06-11 VITALS
WEIGHT: 181 LBS | RESPIRATION RATE: 16 BRPM | OXYGEN SATURATION: 99 % | TEMPERATURE: 97.6 F | DIASTOLIC BLOOD PRESSURE: 69 MMHG | SYSTOLIC BLOOD PRESSURE: 128 MMHG | HEART RATE: 61 BPM | HEIGHT: 67.2 IN | BODY MASS INDEX: 28.08 KG/M2

## 2021-06-15 ENCOUNTER — APPOINTMENT (OUTPATIENT)
Dept: HEMATOLOGY ONCOLOGY | Facility: CLINIC | Age: 56
End: 2021-06-15
Payer: COMMERCIAL

## 2021-06-15 VITALS
TEMPERATURE: 98.2 F | HEIGHT: 67.2 IN | RESPIRATION RATE: 16 BRPM | HEART RATE: 69 BPM | BODY MASS INDEX: 27.77 KG/M2 | WEIGHT: 179 LBS | DIASTOLIC BLOOD PRESSURE: 84 MMHG | OXYGEN SATURATION: 97 % | SYSTOLIC BLOOD PRESSURE: 157 MMHG

## 2021-06-15 PROCEDURE — 99213 OFFICE O/P EST LOW 20 MIN: CPT

## 2021-06-15 PROCEDURE — 99072 ADDL SUPL MATRL&STAF TM PHE: CPT

## 2021-06-16 NOTE — ASSESSMENT
[FreeTextEntry1] : Erythrocytosis\par Had syncope s/p ICD implant for SCD, due to MVP and VT\par Cardiac Cath--nl\par Cardiac MRI--nl\par Had normal RBC, HGb prior\par No family ho sudden cardiac death at early age, DVT or PE\par She had first trimester . No pregnancies\par Was on birth control pills for most part of her life and never had blood clot\par No CVA\par \par Normal epo with negative REBECA/CALR/MPL- rules out P Vera\par Her H/H fluctuated  - most recent labs normalized \par Thrombophilia work up largely negative except for borderline IgM B2 glycoprotein AB and anticardiolipin AB screen\par Repeat in 3 months negative for Anticardiolipin AB screen. \par May 2021 normal sleep study\par Advised to reach out to us if any new problems arise at home. \par labs reviewed and discussed \par \par She is relocating to [facility] in the next few months but will continue to follow up with us. \par \par rct in 1 year for follow up. \par CBC, CMP, iron studies, ferritin, lupus anticoagulant panel, IgM and IgG B2 glycoprotein AB and IgG and IgM anticardiolipin AB, antithrombin III\par

## 2021-06-16 NOTE — HISTORY OF PRESENT ILLNESS
[de-identified] : Ms. Shanon Teixeira is 55 year old female with history of erythrocytosis, referred by Dr. Josr Carlson\par \par Patient with history of mitral valve prolapse, ventricular fibrillation who was on bedrest from Jan 2020 to March 2020 from right ankle fracture, started PT and then had cardiac arrest at home in April 2020 during one of her PT session s/p CPR with successful resuscitation and subcutaneous defibrillator placement in June 2020 with Dr. Josr Carlson and Dr. Jimmie Rees\par \par 2/12/21 wbc 8.10 Hgb 15.6 hct 50.7 mcv 91.7 plts 340 RBC 5.53 Ca 10.7 Alk phos - 141 \par 11/5/20 Hgb 15.4 hct 48.6 MCV 88.4 Ca - 10.4 alk phos  - 132\par Hgb/Hct normalized in July 2020\par \par SHx: never smoked\par No family history of hematological and/or oncological. \par Post menopausal since 2016.\par Patient had osteopenia on foot 15 yrs ago, on Fosamax x 2 years and now osteopenia on wrist - last Dexa was Oct 2020. She  has been taking Ca and  [de-identified] : She is seen today for follow up and review blood work from a few days ago. \par \par Patient reports of doing well with no new symptoms, right lower leg (s/p fracture and surgery) is getiting stronger, she's now able to do 5 miles per daily.  \par

## 2021-06-16 NOTE — RESULTS/DATA
[FreeTextEntry1] : Labs reviewed, analyzed and discussed\par \par Factor V Leiden mutation, Prothrombin Gene mutation, Protein S, AT3- negative\par Lupus AC panel- negative\par Anticardiolipin AB screen- positive - Negative in June 2021 lab. \par IgM B2 glycoprotein Ab - 20.6 (slightly elevated) - normalized. \par \par TIBC 406 \par Ferritin 87  -> 51 \par \par Normal epo \par Negative REBECA/CALR/MPL\par

## 2021-06-23 ENCOUNTER — APPOINTMENT (OUTPATIENT)
Dept: HEART AND VASCULAR | Facility: CLINIC | Age: 56
End: 2021-06-23
Payer: COMMERCIAL

## 2021-06-23 PROCEDURE — 93295 DEV INTERROG REMOTE 1/2/MLT: CPT

## 2021-06-23 PROCEDURE — 93296 REM INTERROG EVL PM/IDS: CPT

## 2021-07-11 ENCOUNTER — RX RENEWAL (OUTPATIENT)
Age: 56
End: 2021-07-11

## 2021-07-28 PROBLEM — I47.2 VENTRICULAR TACHYCARDIA: Status: ACTIVE | Noted: 2021-05-06

## 2021-08-02 ENCOUNTER — APPOINTMENT (OUTPATIENT)
Dept: HEART AND VASCULAR | Facility: CLINIC | Age: 56
End: 2021-08-02
Payer: COMMERCIAL

## 2021-08-02 VITALS
TEMPERATURE: 98.6 F | DIASTOLIC BLOOD PRESSURE: 70 MMHG | BODY MASS INDEX: 29.99 KG/M2 | HEIGHT: 65 IN | WEIGHT: 180 LBS | HEART RATE: 59 BPM | SYSTOLIC BLOOD PRESSURE: 110 MMHG

## 2021-08-02 PROCEDURE — 99212 OFFICE O/P EST SF 10 MIN: CPT | Mod: 25

## 2021-08-02 PROCEDURE — 93282 PRGRMG EVAL IMPLANTABLE DFB: CPT

## 2021-08-02 PROCEDURE — 99072 ADDL SUPL MATRL&STAF TM PHE: CPT

## 2021-08-03 NOTE — REVIEW OF SYSTEMS
[Fever] : no fever [Chills] : no chills [Feeling Fatigued] : not feeling fatigued [SOB] : no shortness of breath [Dyspnea on exertion] : not dyspnea during exertion [Chest Discomfort] : no chest discomfort [Palpitations] : no palpitations [Syncope] : no syncope [Cough] : no cough [Abdominal Pain] : no abdominal pain [Joint Pain] : no joint pain [Rash] : no rash [Dizziness] : no dizziness [Confusion] : no confusion was observed [Under Stress] : not under stress

## 2021-08-03 NOTE — DISCUSSION/SUMMARY
[FreeTextEntry1] : 55 year old female with history of cardiac arrest due to a shockable rhythm, with a reduced EF, frequent short coupled PVCs and non obstructive CAD s/p SQ ICD 6/2020 who presents for follow up.  She is doing well without any complaints.  Incision well healed.  Interrogation with no arrhythmia and remote monitoring is working. Battery at 85% and reviewed settings with [employer] which are optimally set for potential device warnings.  We discussed with MsLoni Weinbergcaitlin that there was a warning that a very small amount of SQ ICD leads can have a fracture and the importance of remote monitoring / follow up.  She will continue to utilize remote monitoring and follow up in 1 year or sooner if needed and knows to call with any questions or concerns.

## 2021-08-03 NOTE — PROCEDURE
[de-identified] : "[employer] Scientific SQ ICD\par [id number]\par battery good\par no events

## 2021-08-03 NOTE — PHYSICAL EXAM
[General Appearance - Well Developed] : well developed [Normal Appearance] : normal appearance [Well Groomed] : well groomed [General Appearance - Well Nourished] : well nourished [No Deformities] : no deformities [General Appearance - In No Acute Distress] : no acute distress [Edema] : no peripheral edema present [] : no respiratory distress [Respiration, Rhythm And Depth] : normal respiratory rhythm and effort [Exaggerated Use Of Accessory Muscles For Inspiration] : no accessory muscle use [Clean] : clean [Dry] : dry [Well-Healed] : well-healed [Heart Rate And Rhythm] : heart rate and rhythm were normal [Heart Sounds] : normal S1 and S2 [Palpable Crepitus] : no palpable crepitus [Bleeding] : no active bleeding [Foul Odor] : no foul smell [Purulent Drainage] : no purulent drainage [Serosanguineous Drainage] : no serosanquineous drainage [Serous Drainage] : no serous drainage [Erythema] : not erythematous [Warm] : not warm [Tender] : not tender [Indurated] : not indurated [Fluctuant] : not fluctuant

## 2021-08-03 NOTE — HISTORY OF PRESENT ILLNESS
[de-identified] : \par \par 55 year old pleasant female with history of cardiac arrest due to a shockable rhythm, with a reduced EF, frequent short coupled PVCs and non obstructive CAD s/p SQ ICD 6/2020 who presents for follow up.\par \par She presents for routine follow up and has no complaints.  NO ICD shocks and no recurrent syncope.  Remote monitoring is working.  NO device relates issues.  No chest pain, palpitations, near syncope or SOB.

## 2021-08-04 ENCOUNTER — APPOINTMENT (OUTPATIENT)
Dept: CARDIOLOGY | Facility: CLINIC | Age: 56
End: 2021-08-04
Payer: COMMERCIAL

## 2021-08-04 VITALS
SYSTOLIC BLOOD PRESSURE: 110 MMHG | DIASTOLIC BLOOD PRESSURE: 70 MMHG | HEART RATE: 60 BPM | HEIGHT: 65 IN | WEIGHT: 180 LBS | TEMPERATURE: 98.5 F | BODY MASS INDEX: 29.99 KG/M2

## 2021-08-04 DIAGNOSIS — I47.2 VENTRICULAR TACHYCARDIA: ICD-10-CM

## 2021-08-04 PROCEDURE — 99214 OFFICE O/P EST MOD 30 MIN: CPT

## 2021-08-04 PROCEDURE — 99072 ADDL SUPL MATRL&STAF TM PHE: CPT

## 2021-08-04 NOTE — PHYSICAL EXAM
[General Appearance - Well Developed] : well developed [Normal Appearance] : normal appearance [Well Groomed] : well groomed [General Appearance - Well Nourished] : well nourished [No Deformities] : no deformities [General Appearance - In No Acute Distress] : no acute distress [Normal Conjunctiva] : the conjunctiva exhibited no abnormalities [Eyelids - No Xanthelasma] : the eyelids demonstrated no xanthelasmas [Normal Oral Mucosa] : normal oral mucosa [No Oral Pallor] : no oral pallor [No Oral Cyanosis] : no oral cyanosis [Normal Jugular Venous A Waves Present] : normal jugular venous A waves present [Normal Jugular Venous V Waves Present] : normal jugular venous V waves present [No Jugular Venous Blake A Waves] : no jugular venous blake A waves [Respiration, Rhythm And Depth] : normal respiratory rhythm and effort [Exaggerated Use Of Accessory Muscles For Inspiration] : no accessory muscle use [Auscultation Breath Sounds / Voice Sounds] : lungs were clear to auscultation bilaterally [Abdomen Soft] : soft [Abdomen Tenderness] : non-tender [Abdomen Mass (___ Cm)] : no abdominal mass palpated [Abnormal Walk] : normal gait [Gait - Sufficient For Exercise Testing] : the gait was sufficient for exercise testing [Cyanosis, Localized] : no localized cyanosis [Nail Clubbing] : no clubbing of the fingernails [Petechial Hemorrhages (___cm)] : no petechial hemorrhages [Skin Color & Pigmentation] : normal skin color and pigmentation [No Venous Stasis] : no venous stasis [] : no rash [Skin Lesions] : no skin lesions [No Skin Ulcers] : no skin ulcer [No Xanthoma] : no  xanthoma was observed [Oriented To Time, Place, And Person] : oriented to person, place, and time [Affect] : the affect was normal [Mood] : the mood was normal [No Anxiety] : not feeling anxious [5th Left ICS - MCL] : palpated at the 5th LICS in the midclavicular line [Normal] : normal [Normal Rate] : normal [Rhythm Regular] : regular [Normal S1] : normal S1 [Normal S2] : normal S2 [Click] : a ~M click was heard [No Murmur] : no murmurs heard [III] : a grade 3 [No Abnormalities] : the abdominal aorta was not enlarged and no bruit was heard [No Pitting Edema] : no pitting edema present [Right Carotid Bruit] : no bruit heard over the right carotid [Left Carotid Bruit] : no bruit heard over the left carotid [Right Femoral Bruit] : no bruit heard over the right femoral artery [Left Femoral Bruit] : no bruit heard over the left femoral artery [Bruit] : no bruit heard [Rt] : no varicose veins of the right leg [Lt] : no varicose veins of the left leg

## 2021-08-04 NOTE — ASSESSMENT
[FreeTextEntry1] : #1) cardiorespiratory collapse and arrest.\par At this point, I believe this patient had a primary arrhythmia probably due to her underlying mitral valve prolapse.\par No evidence of CAD, cardiomyopathy.\par S/P ICD, as she is high risk for recurrence\par Doing well. 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 I suspect that she has moderate mitral valvular insufficiency with his very prominent murmur. This certainly can induce ventricular tachyarrhythmias. MR does not warrant intervention,\par \par \par \par

## 2021-08-04 NOTE — HISTORY OF PRESENT ILLNESS
[FreeTextEntry1] : Patient returns after ICD implant for SCD, due to MVP and VT\par \par The patient is a 55 year-old woman who is well and in her usual state of good health until April 30, 2020. She was in the midst of recovering from an ankle fracture, sustained from a fall on the ice in December, 2019. She was doing virtual physical therapy at home when she suddenly call out for assistance. Her , with who was home and who is a volunteer with the fire Department responded. He found her on the floor and summoned 911. When EMS arrived they attach her to a defibrillator and found her to be in ventricular fibrillation. She was successfully defibrillated. She was then taken to [facility] where she was hospitalized through May 4. During that admission the following testing was done:\par #1) CT of the chest: Left lower lobe collapse. No evidence of pulmonary embolism by PE protocol.\par #2) borderline troponins not consistent with acute MI\par #3) echocardiogram--reportedly showing normal LV function. No comment on mitral valve structure.\par The patient was intubated and maintained on a ventilator. She was successfully extubated and treated for pneumonia with possible aspiration. She was discharged from the hospital on May 4 and has been home since.The discharge dx from [facility] was syncope. No evidence of cardiac arrhythmia on monitoring. She saw her primary care physician last week. He referred her to cardiology.\par The patient does carry a diagnosis of mitral valve prolapse. She has known of a heart murmur for many many years.\par Approximately 4 years ago, the patient had an episode of syncope for which she was placed on metoprolol tartrate 5 mg b.i.d. She continued to take metoprolol tartrate all the time of this event but has been off it since.\par ===============================================================================\par Recent Summary and w/u:\par Echo:May 15, 2020\par           Normal LV size and function\par           Mild to moderate MVP and mild to moderate MR\par CTA: Normal. Calcium score of 0.  No CAD\par ZIO:  Frequent PVCs and nonsustained AIVR\par She was admitted to [facility], where she had:\par Cardiac Cath--nl\par Cardiac MRI--nl\par =====================================================================================\par The patient has enjoyed good health. No history of hypertension or diabetes or hyperlipidemia. She is a nonsmoker--never.\par =================================================================================\par Then, on 6/23/20  underwent Subcutaneous ICD ([employer] EMBLEM MRI with Dr. Pernell Petersen. This was a [employer] and one MRI S.-ICD (A219) with a serial #[id number]. This is a single chamber cardioverter defibrillator implanted without difficulty.\par ===========================================================================================\par NOW: She has undergone an extensive workup with Dr. Medina of hematology for polycythemia. She is also seeing Dr. Mills of gastroenterology. Thus far, workup for lupus anticoagulant, factor V Leyden, hemochromatosis, and serum protein electrophoresis are all normal. No abnormalities of any testing have been identified thus far.\par \par She is feeling well. No specific complaints. Only medication is Toprol XL. She is having periodic ICD monitoring with Dr. Gimenez.\par \par EKG today--(5/13/2021):Sinus  Bradycardia \par nl axis\par delayed R-wave progression again noted (Septal Q-waves V1--V3)\par NSC\par

## 2021-09-22 ENCOUNTER — NON-APPOINTMENT (OUTPATIENT)
Age: 56
End: 2021-09-22

## 2021-09-22 ENCOUNTER — APPOINTMENT (OUTPATIENT)
Dept: HEART AND VASCULAR | Facility: CLINIC | Age: 56
End: 2021-09-22
Payer: COMMERCIAL

## 2021-09-22 PROCEDURE — 93296 REM INTERROG EVL PM/IDS: CPT

## 2021-09-22 PROCEDURE — 93295 DEV INTERROG REMOTE 1/2/MLT: CPT

## 2021-12-02 ENCOUNTER — APPOINTMENT (OUTPATIENT)
Dept: CARDIOLOGY | Facility: CLINIC | Age: 56
End: 2021-12-02
Payer: COMMERCIAL

## 2021-12-02 ENCOUNTER — NON-APPOINTMENT (OUTPATIENT)
Age: 56
End: 2021-12-02

## 2021-12-02 VITALS
HEART RATE: 63 BPM | OXYGEN SATURATION: 97 % | DIASTOLIC BLOOD PRESSURE: 84 MMHG | RESPIRATION RATE: 16 BRPM | WEIGHT: 183 LBS | SYSTOLIC BLOOD PRESSURE: 122 MMHG | TEMPERATURE: 97.1 F | BODY MASS INDEX: 30.45 KG/M2

## 2021-12-02 PROCEDURE — 93000 ELECTROCARDIOGRAM COMPLETE: CPT

## 2021-12-02 PROCEDURE — 99072 ADDL SUPL MATRL&STAF TM PHE: CPT

## 2021-12-02 PROCEDURE — 99214 OFFICE O/P EST MOD 30 MIN: CPT

## 2021-12-02 PROCEDURE — 36415 COLL VENOUS BLD VENIPUNCTURE: CPT

## 2021-12-02 NOTE — ASSESSMENT
[FreeTextEntry1] : #1) cardiorespiratory collapse and arrest.\par At this point, I believe this patient had a primary arrhythmia probably due to her underlying mitral valve prolapse.\par No evidence of CAD, cardiomyopathy.\par S/P ICD, as she is high risk for recurrence\par Doing well. 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 I suspect that she has moderate mitral valvular insufficiency with his very prominent murmur. This certainly can induce ventricular tachyarrhythmias. MR does not warrant intervention,\par \par \par \par ***Willl plan f/u Echo after next visit compare to May 15, 2020

## 2021-12-02 NOTE — HISTORY OF PRESENT ILLNESS
[FreeTextEntry1] : Patient returns after ICD implant for SCD, due to MVP and VT\par \par The patient is a 56 year-old woman who is well and in her usual state of good health until April 30, 2020. She was in the midst of recovering from an ankle fracture, sustained from a fall on the ice in December, 2019. She was doing virtual physical therapy at home when she suddenly call out for assistance. Her , with who was home and who is a volunteer with the fire Department responded. He found her on the floor and summoned 911. When EMS arrived they attach her to a defibrillator and found her to be in ventricular fibrillation. She was successfully defibrillated. She was then taken to [facility] where she was hospitalized through May 4. During that admission the following testing was done:\par #1) CT of the chest: Left lower lobe collapse. No evidence of pulmonary embolism by PE protocol.\par #2) borderline troponins not consistent with acute MI\par #3) echocardiogram--reportedly showing normal LV function. No comment on mitral valve structure.\par The patient was intubated and maintained on a ventilator. She was successfully extubated and treated for pneumonia with possible aspiration. She was discharged from the hospital on May 4 and has been home since.The discharge dx from [facility] was syncope. No evidence of cardiac arrhythmia on monitoring. She saw her primary care physician last week. He referred her to cardiology.\par The patient does carry a diagnosis of mitral valve prolapse. She has known of a heart murmur for many many years.\par Approximately 4 years ago, the patient had an episode of syncope for which she was placed on metoprolol tartrate 5 mg b.i.d. She continued to take metoprolol tartrate all the time of this event but has been off it since.\par ===============================================================================\par Recent Summary and w/u:\par Echo:May 15, 2020\par           Normal LV size and function\par           Mild to moderate MVP and mild to moderate MR\par CTA: Normal. Calcium score of 0.  No CAD\par ZIO:  Frequent PVCs and nonsustained AIVR\par She was admitted to [facility], where she had:\par Cardiac Cath--nl\par Cardiac MRI--nl\par =====================================================================================\par The patient has enjoyed good health. No history of hypertension or diabetes or hyperlipidemia. She is a nonsmoker--never.\par =================================================================================\par Then, on 6/23/20  underwent Subcutaneous ICD ([employer] EMBLEM MRI with Dr. Pernell Petersen. This was a [employer] and one MRI S.-ICD (A219) with a serial #[id number]. This is a single chamber cardioverter defibrillator implanted without difficulty.\par ===========================================================================================\par NOW: She has undergone an extensive workup with Dr. Medina of hematology for polycythemia. She is also seeing Dr. Mills of gastroenterology. Thus far, workup for lupus anticoagulant, factor V Leyden, hemochromatosis, and serum protein electrophoresis are all normal. No abnormalities of any testing have been identified thus far.\par \par She is feeling well. No specific complaints. Only medication is Toprol XL. She is having periodic ICD monitoring with Dr. Gimenez.\par \par EKG today--(5/13/2021):Sinus  Bradycardia \par nl axis\par delayed R-wave progression again noted (Septal Q-waves V1--V3)\par NSC\par

## 2021-12-04 LAB
ALBUMIN SERPL ELPH-MCNC: 4.7 G/DL
ALP BLD-CCNC: 122 U/L
ALT SERPL-CCNC: 15 U/L
ANION GAP SERPL CALC-SCNC: 14 MMOL/L
AST SERPL-CCNC: 26 U/L
BASOPHILS # BLD AUTO: 0.04 K/UL
BASOPHILS NFR BLD AUTO: 0.6 %
BILIRUB SERPL-MCNC: 0.4 MG/DL
BUN SERPL-MCNC: 16 MG/DL
CALCIUM SERPL-MCNC: 10.2 MG/DL
CHLORIDE SERPL-SCNC: 104 MMOL/L
CHOLEST SERPL-MCNC: 197 MG/DL
CO2 SERPL-SCNC: 25 MMOL/L
CREAT SERPL-MCNC: 0.78 MG/DL
EOSINOPHIL # BLD AUTO: 0.15 K/UL
EOSINOPHIL NFR BLD AUTO: 2.2 %
GLUCOSE SERPL-MCNC: 91 MG/DL
HCT VFR BLD CALC: 47.7 %
HDLC SERPL-MCNC: 64 MG/DL
HGB BLD-MCNC: 14.8 G/DL
IMM GRANULOCYTES NFR BLD AUTO: 0.3 %
LDLC SERPL CALC-MCNC: 102 MG/DL
LYMPHOCYTES # BLD AUTO: 2.49 K/UL
LYMPHOCYTES NFR BLD AUTO: 37.3 %
MAN DIFF?: NORMAL
MCHC RBC-ENTMCNC: 28.2 PG
MCHC RBC-ENTMCNC: 31 GM/DL
MCV RBC AUTO: 91 FL
MONOCYTES # BLD AUTO: 0.59 K/UL
MONOCYTES NFR BLD AUTO: 8.8 %
NEUTROPHILS # BLD AUTO: 3.38 K/UL
NEUTROPHILS NFR BLD AUTO: 50.8 %
NONHDLC SERPL-MCNC: 132 MG/DL
PLATELET # BLD AUTO: 319 K/UL
POTASSIUM SERPL-SCNC: 5 MMOL/L
PROT SERPL-MCNC: 7.4 G/DL
RBC # BLD: 5.24 M/UL
RBC # FLD: 13.8 %
SODIUM SERPL-SCNC: 143 MMOL/L
TRIGL SERPL-MCNC: 153 MG/DL
WBC # FLD AUTO: 6.67 K/UL

## 2021-12-22 ENCOUNTER — APPOINTMENT (OUTPATIENT)
Dept: HEART AND VASCULAR | Facility: CLINIC | Age: 56
End: 2021-12-22
Payer: COMMERCIAL

## 2021-12-22 ENCOUNTER — NON-APPOINTMENT (OUTPATIENT)
Age: 56
End: 2021-12-22

## 2021-12-22 PROCEDURE — 93295 DEV INTERROG REMOTE 1/2/MLT: CPT | Mod: NC

## 2021-12-22 PROCEDURE — 93296 REM INTERROG EVL PM/IDS: CPT | Mod: NC

## 2021-12-31 ENCOUNTER — TRANSCRIPTION ENCOUNTER (OUTPATIENT)
Age: 56
End: 2021-12-31

## 2022-01-07 ENCOUNTER — TRANSCRIPTION ENCOUNTER (OUTPATIENT)
Age: 57
End: 2022-01-07

## 2022-01-10 ENCOUNTER — TRANSCRIPTION ENCOUNTER (OUTPATIENT)
Age: 57
End: 2022-01-10

## 2022-01-12 ENCOUNTER — TRANSCRIPTION ENCOUNTER (OUTPATIENT)
Age: 57
End: 2022-01-12

## 2022-03-23 ENCOUNTER — NON-APPOINTMENT (OUTPATIENT)
Age: 57
End: 2022-03-23

## 2022-03-23 ENCOUNTER — APPOINTMENT (OUTPATIENT)
Dept: HEART AND VASCULAR | Facility: CLINIC | Age: 57
End: 2022-03-23
Payer: COMMERCIAL

## 2022-03-23 PROCEDURE — 93295 DEV INTERROG REMOTE 1/2/MLT: CPT

## 2022-03-23 PROCEDURE — 93296 REM INTERROG EVL PM/IDS: CPT

## 2022-04-28 ENCOUNTER — NON-APPOINTMENT (OUTPATIENT)
Age: 57
End: 2022-04-28

## 2022-04-28 ENCOUNTER — APPOINTMENT (OUTPATIENT)
Dept: CARDIOLOGY | Facility: CLINIC | Age: 57
End: 2022-04-28
Payer: COMMERCIAL

## 2022-04-28 VITALS
BODY MASS INDEX: 29.16 KG/M2 | WEIGHT: 175 LBS | DIASTOLIC BLOOD PRESSURE: 70 MMHG | SYSTOLIC BLOOD PRESSURE: 110 MMHG | HEIGHT: 65 IN

## 2022-04-28 PROCEDURE — 99214 OFFICE O/P EST MOD 30 MIN: CPT

## 2022-04-28 PROCEDURE — 36415 COLL VENOUS BLD VENIPUNCTURE: CPT

## 2022-04-28 PROCEDURE — 93000 ELECTROCARDIOGRAM COMPLETE: CPT

## 2022-04-28 NOTE — HISTORY OF PRESENT ILLNESS
[FreeTextEntry1] : Patient returns after ICD implant for SCD, due to MVP and VT\par \par The patient is a 56 year-old woman who is well and in her usual state of good health until April 30, 2020. She was in the midst of recovering from an ankle fracture, sustained from a fall on the ice in December, 2019. She was doing virtual physical therapy at home when she suddenly call out for assistance. Her , with who was home and who is a volunteer with the fire Department responded. He found her on the floor and summoned 911. When EMS arrived they attach her to a defibrillator and found her to be in ventricular fibrillation. She was successfully defibrillated. She was then taken to [facility] where she was hospitalized through May 4. During that admission the following testing was done:\par #1) CT of the chest: Left lower lobe collapse. No evidence of pulmonary embolism by PE protocol.\par #2) borderline troponins not consistent with acute MI\par #3) echocardiogram--reportedly showing normal LV function. No comment on mitral valve structure.\par The patient was intubated and maintained on a ventilator. She was successfully extubated and treated for pneumonia with possible aspiration. She was discharged from the hospital on May 4 and has been home since.The discharge dx from [facility] was syncope. No evidence of cardiac arrhythmia on monitoring. She saw her primary care physician last week. He referred her to cardiology.\par The patient does carry a diagnosis of mitral valve prolapse. She has known of a heart murmur for many many years.\par Approximately 4 years ago, the patient had an episode of syncope for which she was placed on metoprolol tartrate 5 mg b.i.d. She continued to take metoprolol tartrate all the time of this event but has been off it since.\par ===============================================================================\par Recent Summary and w/u:\par Echo:May 15, 2020\par           Normal LV size and function\par           Mild to moderate MVP and mild to moderate MR\par CTA: Normal. Calcium score of 0.  No CAD\par ZIO:  Frequent PVCs and nonsustained AIVR\par She was admitted to [facility], where she had:\par Cardiac Cath--nl\par Cardiac MRI--nl\par =====================================================================================\par The patient has enjoyed good health. No history of hypertension or diabetes or hyperlipidemia. She is a nonsmoker--never.\par =================================================================================\par Then, on 6/23/20  underwent Subcutaneous ICD ([employer] EMBLEM MRI with Dr. Pernell Petersen. This was a [employer] and one MRI S.-ICD (A219) with a serial #[id number]. This is a single chamber cardioverter defibrillator implanted without difficulty.\par ===========================================================================================\par NOW: She has undergone an extensive workup with Dr. Medina of hematology for polycythemia. She is also seeing Dr. Mills of gastroenterology. Thus far, workup for lupus anticoagulant, factor V Leyden, hemochromatosis, and serum protein electrophoresis are all normal. No abnormalities of any testing have been identified thus far.\par \par She is feeling well. No specific complaints. Only medication is Toprol XL. She is having periodic ICD monitoring with Dr. Gimenez. Last check was March 2022\par \par EKG today--(4/28/2022):\par Sinus  Bradycardia \par nl axis\par delayed R-wave progression again noted (Septal Q-waves V1--V3)\par NSC\par

## 2022-04-29 LAB
ALBUMIN SERPL ELPH-MCNC: 5 G/DL
ALP BLD-CCNC: 101 U/L
ALT SERPL-CCNC: 12 U/L
ANION GAP SERPL CALC-SCNC: 13 MMOL/L
AST SERPL-CCNC: 20 U/L
BASOPHILS # BLD AUTO: 0.04 K/UL
BASOPHILS NFR BLD AUTO: 0.5 %
BILIRUB SERPL-MCNC: 0.4 MG/DL
BUN SERPL-MCNC: 22 MG/DL
CALCIUM SERPL-MCNC: 10.6 MG/DL
CHLORIDE SERPL-SCNC: 102 MMOL/L
CHOLEST SERPL-MCNC: 216 MG/DL
CO2 SERPL-SCNC: 27 MMOL/L
CREAT SERPL-MCNC: 1.13 MG/DL
EGFR: 57 ML/MIN/1.73M2
EOSINOPHIL # BLD AUTO: 0.14 K/UL
EOSINOPHIL NFR BLD AUTO: 1.7 %
GLUCOSE SERPL-MCNC: 101 MG/DL
HCT VFR BLD CALC: 48.6 %
HDLC SERPL-MCNC: 69 MG/DL
HGB BLD-MCNC: 15.3 G/DL
IMM GRANULOCYTES NFR BLD AUTO: 0.2 %
LDLC SERPL CALC-MCNC: 115 MG/DL
LYMPHOCYTES # BLD AUTO: 2.75 K/UL
LYMPHOCYTES NFR BLD AUTO: 33.3 %
MAN DIFF?: NORMAL
MCHC RBC-ENTMCNC: 28 PG
MCHC RBC-ENTMCNC: 31.5 GM/DL
MCV RBC AUTO: 88.8 FL
MONOCYTES # BLD AUTO: 0.5 K/UL
MONOCYTES NFR BLD AUTO: 6 %
NEUTROPHILS # BLD AUTO: 4.82 K/UL
NEUTROPHILS NFR BLD AUTO: 58.3 %
NONHDLC SERPL-MCNC: 147 MG/DL
PLATELET # BLD AUTO: 280 K/UL
POTASSIUM SERPL-SCNC: 4.9 MMOL/L
PROT SERPL-MCNC: 7.7 G/DL
RBC # BLD: 5.47 M/UL
RBC # FLD: 13.3 %
SODIUM SERPL-SCNC: 142 MMOL/L
TRIGL SERPL-MCNC: 161 MG/DL
WBC # FLD AUTO: 8.27 K/UL

## 2022-06-10 ENCOUNTER — APPOINTMENT (OUTPATIENT)
Dept: HEMATOLOGY ONCOLOGY | Facility: CLINIC | Age: 57
End: 2022-06-10

## 2022-06-10 ENCOUNTER — RESULT REVIEW (OUTPATIENT)
Age: 57
End: 2022-06-10

## 2022-06-10 VITALS
BODY MASS INDEX: 29.04 KG/M2 | WEIGHT: 174.31 LBS | HEART RATE: 58 BPM | OXYGEN SATURATION: 99 % | DIASTOLIC BLOOD PRESSURE: 80 MMHG | TEMPERATURE: 96.9 F | SYSTOLIC BLOOD PRESSURE: 129 MMHG | RESPIRATION RATE: 16 BRPM | HEIGHT: 65 IN

## 2022-06-17 ENCOUNTER — APPOINTMENT (OUTPATIENT)
Dept: HEMATOLOGY ONCOLOGY | Facility: CLINIC | Age: 57
End: 2022-06-17
Payer: COMMERCIAL

## 2022-06-17 VITALS
HEART RATE: 32 BPM | HEIGHT: 65 IN | BODY MASS INDEX: 29.08 KG/M2 | RESPIRATION RATE: 16 BRPM | TEMPERATURE: 97.9 F | DIASTOLIC BLOOD PRESSURE: 78 MMHG | SYSTOLIC BLOOD PRESSURE: 131 MMHG | OXYGEN SATURATION: 97 % | WEIGHT: 174.56 LBS

## 2022-06-17 PROCEDURE — 99213 OFFICE O/P EST LOW 20 MIN: CPT | Mod: 25

## 2022-06-17 RX ORDER — ALENDRONATE SODIUM 70 MG/1
70 TABLET ORAL
Qty: 4 | Refills: 0 | Status: COMPLETED | COMMUNITY
Start: 2022-03-01

## 2022-06-17 NOTE — ASSESSMENT
[FreeTextEntry1] : ## Erythrocytosis\par Had syncope s/p ICD implant for SCD, due to MVP and VT\par Cardiac Cath--nl\par Cardiac MRI--nl\par No family ho sudden cardiac death at early age, DVT or PE\par She had first trimester . No pregnancies\par Was on birth control pills for most part of her life and never had blood clot\par No CVA\par May 2021 normal sleep study\par -Normal epo with negative REBECA/CALR/MPL- rules out P Vera\par -Thrombophilia work up largely negative except for borderline IgM B2 glycoprotein AB and anticardiolipin AB screen - repeated in 3 months negative for Anticardiolipin AB screen.. \par -repeated with positive lupus anticoagulant and slight elevation of IgM GORGE.\par -patient has no sign and  symptoms of VTEs.\par -labs reviewed, analyzed, and discussed\par to continue to monitor. \par \par She is relocating to [facility] in 2022 but will continue to follow up with us. \par \par Patient was also seen by Dr. Medina \par rct in 6 months for follow up\par CBC, CMP, iron studies, ferritin, lupus anticoagulant panel, IgM and IgG B2 glycoprotein AB and IgG and IgM anticardiolipin AB, antithrombin III, D dimer \par

## 2022-06-17 NOTE — HISTORY OF PRESENT ILLNESS
[de-identified] : Ms. Shanon Teixeira is 55 year old female with history of erythrocytosis, referred by Dr. Josr Carlson\par \par Patient with history of mitral valve prolapse, ventricular fibrillation who was on bedrest from Jan 2020 to March 2020 from right ankle fracture, started PT and then had cardiac arrest at home in April 2020 during one of her PT session s/p CPR with successful resuscitation and subcutaneous defibrillator placement in June 2020 with Dr. Josr Carlson and Dr. Jimmie Rees\par \par 2/12/21 wbc 8.10 Hgb 15.6 hct 50.7 mcv 91.7 plts 340 RBC 5.53 Ca 10.7 Alk phos - 141 \par 11/5/20 Hgb 15.4 hct 48.6 MCV 88.4 Ca - 10.4 alk phos  - 132\par Hgb/Hct normalized in July 2020\par \par SHx: never smoked\par No family history of hematological and/or oncological. \par Post menopausal since 2016.\par Patient had osteopenia on foot 15 yrs ago, on Fosamax x 2 years and now osteopenia on wrist - last Dexa was Oct 2020. She  has been taking Ca and  [de-identified] : She is seen today for follow up and review blood work from a few days ago. \par \par Patient is doing well, has started new prepared meals since Dec 2021 and has lost about 10 lbs. \par She's moving to 20 mins away from [place] but works in [employer] so she'll continue to follow up here. \par

## 2022-06-17 NOTE — RESULTS/DATA
[FreeTextEntry1] : Labs reviewed, analyzed and discussed\par \par Factor V Leiden mutation, Prothrombin Gene mutation, Protein S, AT3- negative\par Lupus AC panel- negative  ->> positive \par Anticardiolipin AB screen- positive - Negative in June 2021 lab. \par IgM B2 glycoprotein Ab - 20.6 (slightly elevated) - normalized. --> slight elevation\par \par TIBC 406 \par Ferritin 87  -> 51 \par \par Normal epo \par Negative REBECA/CALR/MPL\par

## 2022-06-22 ENCOUNTER — APPOINTMENT (OUTPATIENT)
Dept: HEART AND VASCULAR | Facility: CLINIC | Age: 57
End: 2022-06-22

## 2022-06-22 ENCOUNTER — NON-APPOINTMENT (OUTPATIENT)
Age: 57
End: 2022-06-22

## 2022-06-22 PROCEDURE — 93296 REM INTERROG EVL PM/IDS: CPT

## 2022-06-22 PROCEDURE — 93295 DEV INTERROG REMOTE 1/2/MLT: CPT

## 2022-08-25 ENCOUNTER — APPOINTMENT (OUTPATIENT)
Dept: CARDIOLOGY | Facility: CLINIC | Age: 57
End: 2022-08-25

## 2022-08-25 VITALS
HEART RATE: 50 BPM | DIASTOLIC BLOOD PRESSURE: 60 MMHG | WEIGHT: 171 LBS | SYSTOLIC BLOOD PRESSURE: 118 MMHG | RESPIRATION RATE: 17 BRPM | BODY MASS INDEX: 28.46 KG/M2 | OXYGEN SATURATION: 98 % | TEMPERATURE: 98.1 F

## 2022-08-25 PROCEDURE — 36415 COLL VENOUS BLD VENIPUNCTURE: CPT

## 2022-08-25 PROCEDURE — 93000 ELECTROCARDIOGRAM COMPLETE: CPT

## 2022-08-25 PROCEDURE — 99214 OFFICE O/P EST MOD 30 MIN: CPT

## 2022-08-25 RX ORDER — PEG-3350, SODIUM SULFATE, SODIUM CHLORIDE, POTASSIUM CHLORIDE, SODIUM ASCORBATE AND ASCORBIC ACID 7.5-2.691G
100 KIT ORAL
Qty: 1 | Refills: 0 | Status: DISCONTINUED | COMMUNITY
Start: 2021-04-14 | End: 2022-08-25

## 2022-08-25 NOTE — HISTORY OF PRESENT ILLNESS
[FreeTextEntry1] : Patient returns after ICD implant for SCD, due to MVP and VT\par \par The patient is a 57 year-old woman who is well and in her usual state of good health until April 30, 2020. She was in the midst of recovering from an ankle fracture, sustained from a fall on the ice in December, 2019. She was doing virtual physical therapy at home when she suddenly call out for assistance. Her , with who was home and who is a volunteer with the fire Department responded. He found her on the floor and summoned 911. When EMS arrived they attach her to a defibrillator and found her to be in ventricular fibrillation. She was successfully defibrillated. She was then taken to [facility] where she was hospitalized through May 4. During that admission the following testing was done:\par #1) CT of the chest: Left lower lobe collapse. No evidence of pulmonary embolism by PE protocol.\par #2) borderline troponins not consistent with acute MI\par #3) echocardiogram--reportedly showing normal LV function. No comment on mitral valve structure.\par The patient was intubated and maintained on a ventilator. She was successfully extubated and treated for pneumonia with possible aspiration. She was discharged from the hospital on May 4 and has been home since.The discharge dx from [facility] was syncope. No evidence of cardiac arrhythmia on monitoring. She saw her primary care physician last week. He referred her to cardiology.\par The patient does carry a diagnosis of mitral valve prolapse. She has known of a heart murmur for many many years.\par Approximately 4 years ago, the patient had an episode of syncope for which she was placed on metoprolol tartrate 5 mg b.i.d. She continued to take metoprolol tartrate all the time of this event but has been off it since.\par ===============================================================================\par Recent Summary and w/u:\par Echo:May 15, 2020\par           Normal LV size and function\par           Mild to moderate MVP and mild to moderate MR\par CTA: Normal. Calcium score of 0.  No CAD\par ZIO:  Frequent PVCs and nonsustained AIVR\par She was admitted to [facility], where she had:\par Cardiac Cath--nl\par Cardiac MRI--nl\par =====================================================================================\par The patient has enjoyed good health. No history of hypertension or diabetes or hyperlipidemia. She is a nonsmoker--never.\par =================================================================================\par Then, on 6/23/20  underwent Subcutaneous ICD ([employer] EMBLEM MRI with Dr. Pernell Petersen. This was a [employer] and one MRI S.-ICD (A219) with a serial #[id number]. This is a single chamber cardioverter defibrillator implanted without difficulty.\par ===========================================================================================\par NOW: She has undergone an extensive workup with Dr. Medina of hematology for polycythemia. She is also seeing Dr. Mills of gastroenterology. Thus far, workup for lupus anticoagulant, factor V Leyden, hemochromatosis, and serum protein electrophoresis are all normal. No abnormalities of any testing have been identified thus far.\par \par She is feeling well. No specific complaints. Only medication is Toprol XL. She is having periodic ICD monitoring with Dr. Gimenez. Last check was March 2022\par \par EKG today--(4/28/2022):\par Sinus  Bradycardia \par nl axis\par delayed R-wave progression again noted (Septal Q-waves V1--V3)\par NSC\par

## 2022-08-26 LAB
ALBUMIN SERPL ELPH-MCNC: 4.5 G/DL
ALP BLD-CCNC: 79 U/L
ALT SERPL-CCNC: 15 U/L
ANION GAP SERPL CALC-SCNC: 15 MMOL/L
AST SERPL-CCNC: 23 U/L
BASOPHILS # BLD AUTO: 0.05 K/UL
BASOPHILS NFR BLD AUTO: 0.6 %
BILIRUB SERPL-MCNC: 0.4 MG/DL
BUN SERPL-MCNC: 20 MG/DL
CALCIUM SERPL-MCNC: 9.6 MG/DL
CHLORIDE SERPL-SCNC: 102 MMOL/L
CHOLEST SERPL-MCNC: 203 MG/DL
CO2 SERPL-SCNC: 22 MMOL/L
CREAT SERPL-MCNC: 0.98 MG/DL
EGFR: 67 ML/MIN/1.73M2
EOSINOPHIL # BLD AUTO: 0.16 K/UL
EOSINOPHIL NFR BLD AUTO: 2.1 %
GLUCOSE SERPL-MCNC: 84 MG/DL
HCT VFR BLD CALC: 46.4 %
HDLC SERPL-MCNC: 72 MG/DL
HGB BLD-MCNC: 14.6 G/DL
IMM GRANULOCYTES NFR BLD AUTO: 0.3 %
LDLC SERPL CALC-MCNC: 104 MG/DL
LYMPHOCYTES # BLD AUTO: 2.99 K/UL
LYMPHOCYTES NFR BLD AUTO: 38.4 %
MAN DIFF?: NORMAL
MCHC RBC-ENTMCNC: 28 PG
MCHC RBC-ENTMCNC: 31.5 GM/DL
MCV RBC AUTO: 89.1 FL
MONOCYTES # BLD AUTO: 0.75 K/UL
MONOCYTES NFR BLD AUTO: 9.6 %
NEUTROPHILS # BLD AUTO: 3.81 K/UL
NEUTROPHILS NFR BLD AUTO: 49 %
NONHDLC SERPL-MCNC: 131 MG/DL
PLATELET # BLD AUTO: 301 K/UL
POTASSIUM SERPL-SCNC: 4.4 MMOL/L
PROT SERPL-MCNC: 7.1 G/DL
RBC # BLD: 5.21 M/UL
RBC # FLD: 13.8 %
SODIUM SERPL-SCNC: 138 MMOL/L
TRIGL SERPL-MCNC: 134 MG/DL
WBC # FLD AUTO: 7.78 K/UL

## 2022-08-31 DIAGNOSIS — E61.1 IRON DEFICIENCY: ICD-10-CM

## 2022-09-14 ENCOUNTER — APPOINTMENT (OUTPATIENT)
Dept: HEART AND VASCULAR | Facility: CLINIC | Age: 57
End: 2022-09-14

## 2022-09-14 PROCEDURE — 99212 OFFICE O/P EST SF 10 MIN: CPT | Mod: 95

## 2022-09-14 NOTE — PHYSICAL EXAM
[General Appearance - Well Developed] : well developed [Normal Appearance] : normal appearance [Well Groomed] : well groomed [General Appearance - Well Nourished] : well nourished [No Deformities] : no deformities [General Appearance - In No Acute Distress] : no acute distress [Edema] : no peripheral edema present [] : no respiratory distress [Respiration, Rhythm And Depth] : normal respiratory rhythm and effort [Exaggerated Use Of Accessory Muscles For Inspiration] : no accessory muscle use

## 2022-09-15 NOTE — ED ADULT NURSE NOTE - SUICIDE SCREENING DEPRESSION
An angiography was performed of the right lower extremity via hand injection with 10 mL of contrast Negative

## 2022-09-16 ENCOUNTER — RESULT REVIEW (OUTPATIENT)
Age: 57
End: 2022-09-16

## 2022-09-16 ENCOUNTER — APPOINTMENT (OUTPATIENT)
Dept: HEMATOLOGY ONCOLOGY | Facility: CLINIC | Age: 57
End: 2022-09-16

## 2022-09-16 VITALS
DIASTOLIC BLOOD PRESSURE: 59 MMHG | SYSTOLIC BLOOD PRESSURE: 134 MMHG | RESPIRATION RATE: 16 BRPM | TEMPERATURE: 97.5 F | OXYGEN SATURATION: 99 % | HEART RATE: 54 BPM | HEIGHT: 65 IN | BODY MASS INDEX: 29.4 KG/M2 | WEIGHT: 176.44 LBS

## 2022-09-21 NOTE — REVIEW OF SYSTEMS
[Fever] : no fever [Weight Gain (___ Lbs)] : no recent weight gain [Chills] : no chills [Feeling Fatigued] : not feeling fatigued [Weight Loss (___ Lbs)] : no recent weight loss [SOB] : no shortness of breath [Dyspnea on exertion] : not dyspnea during exertion [Chest Discomfort] : no chest discomfort [Palpitations] : no palpitations [Orthopnea] : no orthopnea [Syncope] : no syncope [Cough] : no cough [Abdominal Pain] : no abdominal pain [Dizziness] : no dizziness [Confusion] : no confusion was observed [Under Stress] : not under stress

## 2022-09-21 NOTE — ADDENDUM
[FreeTextEntry1] : I, Pernell Rees, hereby attest that the medical record entry for this patient accurately reflects signatures/notations that I made on the Date of Service in my capacity as an Attending Physician when I treated/diagnosed the above patient. I do hereby attest that this information is true, accurate and complete to the best of my knowledge.  I was present for the entire visit and supervised the entire visit including assessing the patient, conducting exam and establishing the plan of care.  I personally performed the evaluation and management services and agree with the plan as outlined above.\par \par

## 2022-09-21 NOTE — PROCEDURE
[de-identified] : [employer] SQ ICD remote transmission from 7/2022\par [id number]\par battery good (75%)\par no events

## 2022-09-21 NOTE — HISTORY OF PRESENT ILLNESS
[de-identified] : \par \par 57 year old pleasant female with history of cardiac arrest due to a shockable rhythm, with a reduced EF, frequent short coupled PVCs and non obstructive CAD s/p SQ ICD 6/2020 who presents for follow up.\par \par Unfortunately her father is on hospice and as per her request this visit was done via telehealth audio/video.  \par \par She is doing well and has no complaints.  NO ICD shocks and no recurrent syncope.  Remote monitoring is working.  NO device relates issues.  No chest pain, palpitations, near syncope or SOB.

## 2022-09-23 ENCOUNTER — APPOINTMENT (OUTPATIENT)
Dept: HEMATOLOGY ONCOLOGY | Facility: CLINIC | Age: 57
End: 2022-09-23

## 2022-09-23 DIAGNOSIS — R79.1 ABNORMAL COAGULATION PROFILE: ICD-10-CM

## 2022-09-23 PROCEDURE — 99443: CPT | Mod: 25

## 2022-09-23 NOTE — RESULTS/DATA
[FreeTextEntry1] : Labs reviewed, analyzed and discussed\par \par Factor V Leiden mutation, Prothrombin Gene mutation, Protein S, AT3- negative\par Lupus AC panel- negative  ->> positive \par Anticardiolipin AB screen- positive - Negative in June 2021 lab. \par IgM B2 glycoprotein Ab - 20.6 (slightly elevated) - normalized. --> slight elevation -> normal\par \par TIBC 406 \par Ferritin 87  -> 51 \par \par Normal epo \par Negative REBECA/CALR/MPL\par

## 2022-09-23 NOTE — ASSESSMENT
[FreeTextEntry1] : ## Erythrocytosis\par Had syncope s/p ICD implant for SCD, due to MVP and VT\par Cardiac Cath--nl\par Cardiac MRI--nl\par No family ho sudden cardiac death at early age, DVT or PE\par She had first trimester . No pregnancies\par Was on birth control pills for most part of her life and never had blood clot\par No CVA\par May 2021 normal sleep study\par -Normal epo with negative REBECA/CALR/MPL- rules out P Vera\par -Thrombophilia work up largely negative except for borderline IgM B2 glycoprotein AB and anticardiolipin AB screen - repeated in 3 months negative for Anticardiolipin AB screen.. \par -repeated with positive lupus anticoagulant and slight elevation of IgM GORGE.\par Repeat labs in 3 months show negative GORGE nad B2G ABs\par -patient has no sign and  symptoms of VTEs.\par \par She is relocating to [facility] in 2022 but will continue to follow up with us. \par Advised to follow up PRN. She prefers to be evaluated annually\par \par rct in 12 months for follow up\par CBC, CMP, iron studies, ferritin\par

## 2022-09-23 NOTE — HISTORY OF PRESENT ILLNESS
[Home] : at home, [unfilled] , at the time of the visit. [Medical Office: ([facility])___] : at the medical office located in  [Verbal consent obtained from patient] : the patient, [unfilled] [de-identified] : Ms. Shanon Teixeira is 55 year old female with history of erythrocytosis, referred by Dr. Josr Carlson\par \par Patient with history of mitral valve prolapse, ventricular fibrillation who was on bedrest from Jan 2020 to March 2020 from right ankle fracture, started PT and then had cardiac arrest at home in April 2020 during one of her PT session s/p CPR with successful resuscitation and subcutaneous defibrillator placement in June 2020 with Dr. Josr Carlson and Dr. Jimmie Rees\par \par 2/12/21 wbc 8.10 Hgb 15.6 hct 50.7 mcv 91.7 plts 340 RBC 5.53 Ca 10.7 Alk phos - 141 \par 11/5/20 Hgb 15.4 hct 48.6 MCV 88.4 Ca - 10.4 alk phos  - 132\par Hgb/Hct normalized in July 2020\par \par SHx: never smoked\par No family history of hematological and/or oncological. \par Post menopausal since 2016.\par Patient had osteopenia on foot 15 yrs ago, on Fosamax x 2 years and now osteopenia on wrist - last Dexa was Oct 2020. She  has been taking Ca and  [de-identified] : Telemedicine (audio) done today for follow up and review blood work from a few days ago. \par Consent obtained from the patient\par \par SHe is in the process of relocating to [place]\par Feels good overall\par NO new events\par Follows with cards

## 2022-11-02 ENCOUNTER — APPOINTMENT (OUTPATIENT)
Dept: HEART AND VASCULAR | Facility: CLINIC | Age: 57
End: 2022-11-02

## 2022-11-02 ENCOUNTER — NON-APPOINTMENT (OUTPATIENT)
Age: 57
End: 2022-11-02

## 2022-11-02 PROCEDURE — 93296 REM INTERROG EVL PM/IDS: CPT

## 2022-11-02 PROCEDURE — 93295 DEV INTERROG REMOTE 1/2/MLT: CPT

## 2022-11-10 ENCOUNTER — NON-APPOINTMENT (OUTPATIENT)
Age: 57
End: 2022-11-10

## 2022-11-10 ENCOUNTER — APPOINTMENT (OUTPATIENT)
Dept: CARDIOLOGY | Facility: CLINIC | Age: 57
End: 2022-11-10

## 2022-11-10 VITALS
DIASTOLIC BLOOD PRESSURE: 80 MMHG | TEMPERATURE: 97 F | WEIGHT: 170 LBS | HEIGHT: 65 IN | SYSTOLIC BLOOD PRESSURE: 120 MMHG | HEART RATE: 58 BPM | BODY MASS INDEX: 28.32 KG/M2 | RESPIRATION RATE: 16 BRPM | OXYGEN SATURATION: 97 %

## 2022-11-10 PROCEDURE — 93000 ELECTROCARDIOGRAM COMPLETE: CPT

## 2022-11-10 PROCEDURE — 36415 COLL VENOUS BLD VENIPUNCTURE: CPT

## 2022-11-10 PROCEDURE — 99214 OFFICE O/P EST MOD 30 MIN: CPT

## 2022-11-10 NOTE — HISTORY OF PRESENT ILLNESS
[FreeTextEntry1] : Patient returns after ICD implant for SCD, due to MVP and VT\par \par The patient is a 57 year-old woman who is well and in her usual state of good health until April 30, 2020. She was in the midst of recovering from an ankle fracture, sustained from a fall on the ice in December, 2019. She was doing virtual physical therapy at home when she suddenly call out for assistance. Her , with who was home and who is a volunteer with the fire Department responded. He found her on the floor and summoned 911. When EMS arrived they attach her to a defibrillator and found her to be in ventricular fibrillation. She was successfully defibrillated. She was then taken to [facility] where she was hospitalized through May 4. During that admission the following testing was done:\par #1) CT of the chest: Left lower lobe collapse. No evidence of pulmonary embolism by PE protocol.\par #2) borderline troponins not consistent with acute MI\par #3) echocardiogram--reportedly showing normal LV function. No comment on mitral valve structure.\par The patient was intubated and maintained on a ventilator. She was successfully extubated and treated for pneumonia with possible aspiration. She was discharged from the hospital on May 4 and has been home since.The discharge dx from [facility] was syncope. No evidence of cardiac arrhythmia on monitoring. She saw her primary care physician last week. He referred her to cardiology.\par The patient does carry a diagnosis of mitral valve prolapse. She has known of a heart murmur for many many years.\par Approximately 4 years ago, the patient had an episode of syncope for which she was placed on metoprolol tartrate 5 mg b.i.d. She continued to take metoprolol tartrate all the time of this event but has been off it since.\par ===============================================================================\par Recent Summary and w/u:\par Echo:May 15, 2020\par           Normal LV size and function\par           Mild to moderate MVP and mild to moderate MR\par CTA: Normal. Calcium score of 0.  No CAD\par ZIO:  Frequent PVCs and nonsustained AIVR\par She was admitted to [facility], where she had:\par Cardiac Cath--nl\par Cardiac MRI--nl\par =====================================================================================\par The patient has enjoyed good health. No history of hypertension or diabetes or hyperlipidemia. She is a nonsmoker--never.\par =================================================================================\par Then, on 6/23/20  underwent Subcutaneous ICD ([employer] EMBLEM MRI with Dr. Pernell Petersen. This was a [employer] and one MRI S.-ICD (A219) with a serial #[id number]. This is a single chamber cardioverter defibrillator implanted without difficulty.\par ===========================================================================================\par NOW: She has undergone an extensive workup with Dr. Medina of hematology for polycythemia. She is also seeing Dr. Mills of gastroenterology. Thus far, workup for lupus anticoagulant, factor V Leyden, hemochromatosis, and serum protein electrophoresis are all normal. No abnormalities of any testing have been identified thus far.\par \par She is feeling well. No specific complaints. Only medication is Toprol XL. She is having periodic ICD monitoring with Dr. Gimenez. Last check was Nov 2, 2022\par \par EKG today--(4/28/2022):\par Sinus  Bradycardia \par nl axis\par delayed R-wave progression again noted (Septal Q-waves V1--V3)\par NSC\par

## 2022-11-11 LAB
ALBUMIN SERPL ELPH-MCNC: 4.4 G/DL
ALP BLD-CCNC: 73 U/L
ALT SERPL-CCNC: 13 U/L
ANION GAP SERPL CALC-SCNC: 14 MMOL/L
AST SERPL-CCNC: 22 U/L
BASOPHILS # BLD AUTO: 0.04 K/UL
BASOPHILS NFR BLD AUTO: 0.4 %
BILIRUB SERPL-MCNC: 0.3 MG/DL
BUN SERPL-MCNC: 22 MG/DL
CALCIUM SERPL-MCNC: 9.8 MG/DL
CHLORIDE SERPL-SCNC: 103 MMOL/L
CHOLEST SERPL-MCNC: 188 MG/DL
CO2 SERPL-SCNC: 24 MMOL/L
CREAT SERPL-MCNC: 0.95 MG/DL
EGFR: 69 ML/MIN/1.73M2
EOSINOPHIL # BLD AUTO: 0.15 K/UL
EOSINOPHIL NFR BLD AUTO: 1.6 %
GLUCOSE SERPL-MCNC: 94 MG/DL
HCT VFR BLD CALC: 44 %
HDLC SERPL-MCNC: 59 MG/DL
HGB BLD-MCNC: 13.8 G/DL
IMM GRANULOCYTES NFR BLD AUTO: 0.2 %
LDLC SERPL CALC-MCNC: 96 MG/DL
LYMPHOCYTES # BLD AUTO: 2.89 K/UL
LYMPHOCYTES NFR BLD AUTO: 31.2 %
MAN DIFF?: NORMAL
MCHC RBC-ENTMCNC: 28.1 PG
MCHC RBC-ENTMCNC: 31.4 GM/DL
MCV RBC AUTO: 89.6 FL
MONOCYTES # BLD AUTO: 0.75 K/UL
MONOCYTES NFR BLD AUTO: 8.1 %
NEUTROPHILS # BLD AUTO: 5.4 K/UL
NEUTROPHILS NFR BLD AUTO: 58.5 %
NONHDLC SERPL-MCNC: 129 MG/DL
PLATELET # BLD AUTO: 329 K/UL
POTASSIUM SERPL-SCNC: 4.7 MMOL/L
PROT SERPL-MCNC: 7 G/DL
RBC # BLD: 4.91 M/UL
RBC # FLD: 13.6 %
SODIUM SERPL-SCNC: 141 MMOL/L
TRIGL SERPL-MCNC: 166 MG/DL
WBC # FLD AUTO: 9.25 K/UL

## 2023-02-01 ENCOUNTER — NON-APPOINTMENT (OUTPATIENT)
Age: 58
End: 2023-02-01

## 2023-02-01 ENCOUNTER — APPOINTMENT (OUTPATIENT)
Dept: HEART AND VASCULAR | Facility: CLINIC | Age: 58
End: 2023-02-01
Payer: COMMERCIAL

## 2023-02-01 PROCEDURE — 93295 DEV INTERROG REMOTE 1/2/MLT: CPT

## 2023-02-01 PROCEDURE — 93296 REM INTERROG EVL PM/IDS: CPT

## 2023-03-30 PROBLEM — R94.01 ABNORMAL EEG: Status: ACTIVE | Noted: 2020-06-16

## 2023-03-30 NOTE — HISTORY OF PRESENT ILLNESS
[FreeTextEntry1] : Patient returns after ICD implant for SCD, due to MVP and VT\par \par The patient is a 57 year-old woman who is well and in her usual state of good health until April 30, 2020. She was in the midst of recovering from an ankle fracture, sustained from a fall on the ice in December, 2019. She was doing virtual physical therapy at home when she suddenly call out for assistance. Her , with who was home and who is a volunteer with the fire Department responded. He found her on the floor and summoned 911. When EMS arrived they attach her to a defibrillator and found her to be in ventricular fibrillation. She was successfully defibrillated. She was then taken to [facility] where she was hospitalized through May 4. During that admission the following testing was done:\par #1) CT of the chest: Left lower lobe collapse. No evidence of pulmonary embolism by PE protocol.\par #2) borderline troponins not consistent with acute MI\par #3) echocardiogram--reportedly showing normal LV function. No comment on mitral valve structure.\par The patient was intubated and maintained on a ventilator. She was successfully extubated and treated for pneumonia with possible aspiration. She was discharged from the hospital on May 4 and has been home since.The discharge dx from [facility] was syncope. No evidence of cardiac arrhythmia on monitoring. She saw her primary care physician last week. He referred her to cardiology.\par The patient does carry a diagnosis of mitral valve prolapse. She has known of a heart murmur for many many years.\par Approximately 4 years ago, the patient had an episode of syncope for which she was placed on metoprolol tartrate 5 mg b.i.d. She continued to take metoprolol tartrate all the time of this event but has been off it since.\par ===============================================================================\par Recent Summary and w/u:\par Echo:May 15, 2020\par           Normal LV size and function\par           Mild to moderate MVP and mild to moderate MR\par CTA: Normal. Calcium score of 0.  No CAD\par ZIO:  Frequent PVCs and nonsustained AIVR\par She was admitted to [facility], where she had:\par Cardiac Cath--nl\par Cardiac MRI--nl\par =====================================================================================\par The patient has enjoyed good health. No history of hypertension or diabetes or hyperlipidemia. She is a nonsmoker--never.\par =================================================================================\par Then, on 6/23/20  underwent Subcutaneous ICD ([employer] EMBLEM MRI with Dr. Pernell Petersen. This was a [employer] and one MRI S.-ICD (A219) with a serial #[id number]. This is a single chamber cardioverter defibrillator implanted without difficulty.\par ===========================================================================================\par NOW: She has undergone an extensive workup with Dr. Medina of hematology for polycythemia. She is also seeing Dr. Mills of gastroenterology. Thus far, workup for lupus anticoagulant, factor V Leyden, hemochromatosis, and serum protein electrophoresis are all normal. No abnormalities of any testing have been identified thus far.\par \par She is feeling well. No specific complaints. Only medication is Toprol XL. She is having periodic ICD monitoring with Dr. Gimenez. Last check was Nov 2, 2022\par \par EKG today--(4/28/2022):\par Sinus  Bradycardia \par nl axis\par delayed R-wave progression again noted (Septal Q-waves V1--V3)\par NSC\par

## 2023-04-06 ENCOUNTER — APPOINTMENT (OUTPATIENT)
Dept: CARDIOLOGY | Facility: CLINIC | Age: 58
End: 2023-04-06
Payer: COMMERCIAL

## 2023-04-06 DIAGNOSIS — R94.01 ABNORMAL ELECTROENCEPHALOGRAM [EEG]: ICD-10-CM

## 2023-05-04 ENCOUNTER — NON-APPOINTMENT (OUTPATIENT)
Age: 58
End: 2023-05-04

## 2023-05-04 ENCOUNTER — APPOINTMENT (OUTPATIENT)
Dept: HEART AND VASCULAR | Facility: CLINIC | Age: 58
End: 2023-05-04
Payer: COMMERCIAL

## 2023-05-04 PROCEDURE — 93296 REM INTERROG EVL PM/IDS: CPT

## 2023-05-04 PROCEDURE — 93295 DEV INTERROG REMOTE 1/2/MLT: CPT

## 2023-06-15 PROBLEM — E78.5 DYSLIPIDEMIA: Status: ACTIVE | Noted: 2022-08-25

## 2023-06-22 ENCOUNTER — APPOINTMENT (OUTPATIENT)
Dept: CARDIOLOGY | Facility: CLINIC | Age: 58
End: 2023-06-22
Payer: COMMERCIAL

## 2023-06-22 ENCOUNTER — NON-APPOINTMENT (OUTPATIENT)
Age: 58
End: 2023-06-22

## 2023-06-22 VITALS
OXYGEN SATURATION: 95 % | HEART RATE: 56 BPM | BODY MASS INDEX: 28.66 KG/M2 | SYSTOLIC BLOOD PRESSURE: 134 MMHG | HEIGHT: 65 IN | WEIGHT: 172 LBS | DIASTOLIC BLOOD PRESSURE: 78 MMHG

## 2023-06-22 DIAGNOSIS — E78.5 HYPERLIPIDEMIA, UNSPECIFIED: ICD-10-CM

## 2023-06-22 PROCEDURE — 93000 ELECTROCARDIOGRAM COMPLETE: CPT

## 2023-06-22 PROCEDURE — 36415 COLL VENOUS BLD VENIPUNCTURE: CPT

## 2023-06-22 PROCEDURE — 99214 OFFICE O/P EST MOD 30 MIN: CPT | Mod: 25

## 2023-06-22 NOTE — HISTORY OF PRESENT ILLNESS
[FreeTextEntry1] : Patient returns after ICD implant for SCD, due to MVP and VT\par \par The patient is a 57 year-old woman who is well and in her usual state of good health until April 30, 2020. She was in the midst of recovering from an ankle fracture, sustained from a fall on the ice in December, 2019. She was doing virtual physical therapy at home when she suddenly call out for assistance. Her , with who was home and who is a volunteer with the fire Department responded. He found her on the floor and summoned 911. When EMS arrived they attach her to a defibrillator and found her to be in ventricular fibrillation. She was successfully defibrillated. She was then taken to [facility] where she was hospitalized through May 4. During that admission the following testing was done:\par #1) CT of the chest: Left lower lobe collapse. No evidence of pulmonary embolism by PE protocol.\par #2) borderline troponins not consistent with acute MI\par #3) echocardiogram--reportedly showing normal LV function. No comment on mitral valve structure.\par The patient was intubated and maintained on a ventilator. She was successfully extubated and treated for pneumonia with possible aspiration. She was discharged from the hospital on May 4 and has been home since.The discharge dx from [facility] was syncope. No evidence of cardiac arrhythmia on monitoring. She saw her primary care physician last week. He referred her to cardiology.\par The patient does carry a diagnosis of mitral valve prolapse. She has known of a heart murmur for many many years.\par Approximately 4 years ago, the patient had an episode of syncope for which she was placed on metoprolol tartrate 5 mg b.i.d. She continued to take metoprolol tartrate all the time of this event but has been off it since.\par ===============================================================================\par Recent Summary and w/u:\par Echo:May 15, 2020\par           Normal LV size and function\par           Mild to moderate MVP and mild to moderate MR\par CTA: Normal. Calcium score of 0.  No CAD\par ZIO:  Frequent PVCs and nonsustained AIVR\par She was admitted to [facility], where she had:\par Cardiac Cath--nl\par Cardiac MRI--nl\par =====================================================================================\par The patient has enjoyed good health. No history of hypertension or diabetes or hyperlipidemia. She is a nonsmoker--never.\par =================================================================================\par Then, on 6/23/20  underwent Subcutaneous ICD ([employer] EMBLEM MRI with Dr. Pernell Petersen. This was a [employer] and one MRI S.-ICD (A219) with a serial #[id number]. This is a single chamber cardioverter defibrillator implanted without difficulty.\par ===========================================================================================\par NOW: She has undergone an extensive workup with Dr. Medina of hematology for polycythemia. She is also seeing Dr. Mills of gastroenterology. Thus far, workup for lupus anticoagulant, factor V Leyden, hemochromatosis, and serum protein electrophoresis are all normal. No abnormalities of any testing have been identified thus far.\par \par She is feeling well. No specific complaints. Only medication is Toprol XL. She is having periodic ICD monitoring with Dr. Gimenez. Last check was Nov 2, 2022\par \par EKG today--(4/28/2022):\par Sinus  Bradycardia \par nl axis\par delayed R-wave progression again noted (Septal Q-waves V1--V3)\par NSC\par

## 2023-06-22 NOTE — ASSESSMENT
[FreeTextEntry1] : #1) cardiorespiratory collapse and arrest.\par At this point, I believe this patient had a primary arrhythmia probably due to her underlying mitral valve prolapse.\par No evidence of CAD, cardiomyopathy.\par S/P ICD, as she is high risk for recurrence\par Doing well. 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 I suspect that she has moderate mitral valvular insufficiency with his very prominent murmur. This certainly can induce ventricular tachyarrhythmias. MR does not warrant intervention,\par \par \par \par

## 2023-06-23 LAB
ALBUMIN SERPL ELPH-MCNC: 4.7 G/DL
ALP BLD-CCNC: 74 U/L
ALT SERPL-CCNC: 14 U/L
ANION GAP SERPL CALC-SCNC: 17 MMOL/L
AST SERPL-CCNC: 26 U/L
BILIRUB SERPL-MCNC: 0.3 MG/DL
BUN SERPL-MCNC: 20 MG/DL
CALCIUM SERPL-MCNC: 10.2 MG/DL
CHLORIDE SERPL-SCNC: 103 MMOL/L
CHOLEST SERPL-MCNC: 203 MG/DL
CO2 SERPL-SCNC: 24 MMOL/L
CREAT SERPL-MCNC: 0.98 MG/DL
EGFR: 67 ML/MIN/1.73M2
GLUCOSE SERPL-MCNC: 83 MG/DL
HDLC SERPL-MCNC: 67 MG/DL
LDLC SERPL CALC-MCNC: 105 MG/DL
NONHDLC SERPL-MCNC: 136 MG/DL
POTASSIUM SERPL-SCNC: 4.6 MMOL/L
PROT SERPL-MCNC: 7.2 G/DL
SODIUM SERPL-SCNC: 144 MMOL/L
TRIGL SERPL-MCNC: 157 MG/DL

## 2023-07-09 RX ORDER — METOPROLOL SUCCINATE 50 MG/1
50 TABLET, EXTENDED RELEASE ORAL
Qty: 90 | Refills: 3 | Status: ACTIVE | COMMUNITY
Start: 2021-07-11 | End: 1900-01-01

## 2023-08-10 ENCOUNTER — APPOINTMENT (OUTPATIENT)
Dept: HEART AND VASCULAR | Facility: CLINIC | Age: 58
End: 2023-08-10
Payer: COMMERCIAL

## 2023-08-10 ENCOUNTER — NON-APPOINTMENT (OUTPATIENT)
Age: 58
End: 2023-08-10

## 2023-08-10 PROCEDURE — 93295 DEV INTERROG REMOTE 1/2/MLT: CPT

## 2023-08-10 PROCEDURE — 93296 REM INTERROG EVL PM/IDS: CPT

## 2023-09-14 ENCOUNTER — RESULT REVIEW (OUTPATIENT)
Age: 58
End: 2023-09-14

## 2023-09-26 ENCOUNTER — APPOINTMENT (OUTPATIENT)
Dept: HEMATOLOGY ONCOLOGY | Facility: CLINIC | Age: 58
End: 2023-09-26
Payer: COMMERCIAL

## 2023-09-26 DIAGNOSIS — D75.1 SECONDARY POLYCYTHEMIA: ICD-10-CM

## 2023-09-26 PROCEDURE — 99213 OFFICE O/P EST LOW 20 MIN: CPT | Mod: 25,95

## 2023-09-29 PROBLEM — R55 SYNCOPE AND COLLAPSE: Status: ACTIVE | Noted: 2020-05-18

## 2023-09-29 PROBLEM — I49.01 VENTRICULAR FIBRILLATION: Status: ACTIVE | Noted: 2020-07-30

## 2023-09-29 PROBLEM — I34.0 MITRAL REGURGITATION: Status: ACTIVE | Noted: 2020-05-13

## 2023-10-02 ENCOUNTER — APPOINTMENT (OUTPATIENT)
Dept: HEART AND VASCULAR | Facility: CLINIC | Age: 58
End: 2023-10-02
Payer: COMMERCIAL

## 2023-10-02 DIAGNOSIS — I34.0 NONRHEUMATIC MITRAL (VALVE) INSUFFICIENCY: ICD-10-CM

## 2023-10-02 DIAGNOSIS — R55 SYNCOPE AND COLLAPSE: ICD-10-CM

## 2023-10-02 DIAGNOSIS — I49.01 VENTRICULAR FIBRILLATION: ICD-10-CM

## 2023-10-02 PROCEDURE — 99213 OFFICE O/P EST LOW 20 MIN: CPT

## 2023-11-24 PROBLEM — D58.2 ELEVATED HEMOGLOBIN: Status: ACTIVE | Noted: 2021-04-14

## 2023-11-24 PROBLEM — Z95.810 PRESENCE OF IMPLANTABLE CARDIOVERTER-DEFIBRILLATOR (ICD): Status: ACTIVE | Noted: 2021-11-26

## 2023-11-24 PROBLEM — Z00.00 ENCOUNTER FOR PREVENTIVE HEALTH EXAMINATION: Status: ACTIVE | Noted: 2020-05-11

## 2023-11-30 ENCOUNTER — NON-APPOINTMENT (OUTPATIENT)
Age: 58
End: 2023-11-30

## 2023-11-30 ENCOUNTER — APPOINTMENT (OUTPATIENT)
Dept: CARDIOLOGY | Facility: CLINIC | Age: 58
End: 2023-11-30
Payer: COMMERCIAL

## 2023-11-30 VITALS
BODY MASS INDEX: 29.16 KG/M2 | DIASTOLIC BLOOD PRESSURE: 70 MMHG | WEIGHT: 175 LBS | HEART RATE: 64 BPM | OXYGEN SATURATION: 100 % | SYSTOLIC BLOOD PRESSURE: 128 MMHG | HEIGHT: 65 IN

## 2023-11-30 DIAGNOSIS — Z00.00 ENCOUNTER FOR GENERAL ADULT MEDICAL EXAMINATION W/OUT ABNORMAL FINDINGS: ICD-10-CM

## 2023-11-30 DIAGNOSIS — Z95.810 PRESENCE OF AUTOMATIC (IMPLANTABLE) CARDIAC DEFIBRILLATOR: ICD-10-CM

## 2023-11-30 DIAGNOSIS — D58.2 OTHER HEMOGLOBINOPATHIES: ICD-10-CM

## 2023-11-30 PROCEDURE — 36415 COLL VENOUS BLD VENIPUNCTURE: CPT

## 2023-11-30 PROCEDURE — 93000 ELECTROCARDIOGRAM COMPLETE: CPT

## 2023-11-30 PROCEDURE — 99213 OFFICE O/P EST LOW 20 MIN: CPT | Mod: 25

## 2023-11-30 RX ORDER — COVID-19 ANTIGEN TEST
KIT MISCELLANEOUS
Qty: 8 | Refills: 0 | Status: DISCONTINUED | COMMUNITY
Start: 2022-06-11 | End: 2023-11-30

## 2023-11-30 RX ORDER — VALACYCLOVIR 500 MG/1
500 TABLET, FILM COATED ORAL
Qty: 30 | Refills: 0 | Status: DISCONTINUED | COMMUNITY
Start: 2019-12-24 | End: 2023-11-30

## 2023-11-30 RX ORDER — CHROMIUM 200 MCG
25 MCG TABLET ORAL
Refills: 0 | Status: ACTIVE | COMMUNITY
Start: 2023-11-30

## 2023-11-30 RX ORDER — GUARN/MA-HUANG/P.GIN/S.GINSENG
600-200 TABLET ORAL
Refills: 0 | Status: DISCONTINUED | COMMUNITY
End: 2023-11-30

## 2023-11-30 RX ORDER — ALENDRONATE SODIUM 70 MG/1
70 TABLET ORAL
Qty: 4 | Refills: 0 | Status: DISCONTINUED | COMMUNITY
Start: 2022-03-01 | End: 2023-11-30

## 2023-12-01 LAB
ALBUMIN SERPL ELPH-MCNC: 4.5 G/DL
ALP BLD-CCNC: 85 U/L
ALT SERPL-CCNC: 15 U/L
ANION GAP SERPL CALC-SCNC: 13 MMOL/L
AST SERPL-CCNC: 27 U/L
BILIRUB SERPL-MCNC: 0.3 MG/DL
BUN SERPL-MCNC: 26 MG/DL
CALCIUM SERPL-MCNC: 9.7 MG/DL
CHLORIDE SERPL-SCNC: 104 MMOL/L
CHOLEST SERPL-MCNC: 201 MG/DL
CO2 SERPL-SCNC: 24 MMOL/L
CREAT SERPL-MCNC: 0.97 MG/DL
EGFR: 67 ML/MIN/1.73M2
GLUCOSE SERPL-MCNC: 119 MG/DL
HCT VFR BLD CALC: 43 %
HDLC SERPL-MCNC: 69 MG/DL
HGB BLD-MCNC: 14.1 G/DL
LDLC SERPL CALC-MCNC: 105 MG/DL
MCHC RBC-ENTMCNC: 29.4 PG
MCHC RBC-ENTMCNC: 32.8 GM/DL
MCV RBC AUTO: 89.6 FL
NONHDLC SERPL-MCNC: 132 MG/DL
PLATELET # BLD AUTO: 317 K/UL
POTASSIUM SERPL-SCNC: 4.3 MMOL/L
PROT SERPL-MCNC: 6.9 G/DL
RBC # BLD: 4.8 M/UL
RBC # FLD: 12.7 %
SODIUM SERPL-SCNC: 140 MMOL/L
TRIGL SERPL-MCNC: 157 MG/DL
WBC # FLD AUTO: 6.93 K/UL

## 2024-01-02 ENCOUNTER — NON-APPOINTMENT (OUTPATIENT)
Age: 59
End: 2024-01-02

## 2024-01-02 ENCOUNTER — APPOINTMENT (OUTPATIENT)
Dept: HEART AND VASCULAR | Facility: CLINIC | Age: 59
End: 2024-01-02
Payer: COMMERCIAL

## 2024-01-02 PROCEDURE — 93296 REM INTERROG EVL PM/IDS: CPT

## 2024-01-02 PROCEDURE — 93295 DEV INTERROG REMOTE 1/2/MLT: CPT

## 2024-01-03 NOTE — H&P ADULT - BP NONINVASIVE MEAN (MM HG)
Admission Reconciliation is Completed  Discharge Reconciliation is Not Complete 87 Admission Reconciliation is Completed  Discharge Reconciliation is Completed

## 2024-01-10 ENCOUNTER — APPOINTMENT (OUTPATIENT)
Dept: CARDIOLOGY | Facility: CLINIC | Age: 59
End: 2024-01-10
Payer: COMMERCIAL

## 2024-01-10 PROCEDURE — 99204 OFFICE O/P NEW MOD 45 MIN: CPT | Mod: 95

## 2024-01-10 PROCEDURE — 96040: CPT

## 2024-01-10 NOTE — DISCUSSION/SUMMARY
[TextEntry] : We reviewed the risks, benefits, limitations, and implications of genetic testing.  Additionally, we examined the patients motivation for testing, and the emotional ramifications of the test results.  The patient is aware that results may impact family members.  Counseling resources are available if requested.   BAR[employer], the Genetic Information Non-discrimination Act protects most people from discrimination in health insurance and employment at firms with over 50 employees.  BARBRA does not protect against use of genetic information by life insurance, disability, or long-term care insurers.  Further information on other limitations is available at [email or web address].   After a review of testing options, the patient elected to the combined cardiac  panel offered through [facility] . Panels contain 138  genes associated with varying levels of risk for cardiac arrest. We reviewed the three possible results for each gene on this panel: positive, negative and variant of uncertain significance (VUS).  We discussed that panel testing could result in incidental findings, such as identifying a positive result in a gene with cardiac risks not seen in the current personal or discussed family history. If results are positive, we would discuss the  risks and management options associated with that cardiac condition susceptibility gene and discuss genetic testing for family members.  Pedigree was reviewed with the patient to identify those who should be tested if the patient is positive.  If results are negative or a VUS is identified, the patient would continue to be managed based on personal and family history of their  cardiac condition.

## 2024-01-10 NOTE — REASON FOR VISIT
[FreeTextEntry3] : Dear Dr. Rees      . I saw your patient ANGIE LAINEZ on 01/10/2024 . Please see the note below for the assessment and plan.   ANGIE LAINEZ  was seen  for an initial consultation at the Cardiogenomics Program at [facility] on 01/10/2024.   Ms. LAINEZ was referred by Dr. Rees   for hereditary cardiac predisposition risk assessment and counseling, due to hx cardiac arrest

## 2024-01-10 NOTE — ASSESSMENT
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  The differences between hereditary and sporadic cardiac arrest were reviewed with the patient.  She  has elected to undergo genetic testing due to concerns for  personal history, definitive diagnosis, disease management, family history, concern for the health of family members . Results may change medical management for the patient and may affect the healthcare of other family members. She  expressed understanding of the presented information and satisfaction with having all of Her questions and concerns addressed

## 2024-01-10 NOTE — FAMILY HISTORY
[FreeTextEntry1] : FamilyHistory_20_twCiteListControlStart FamilyHistory_20_twCiteListControlEnd [id number]  A four-generation family history was constructed and scanned into [employer].  Family history is significant for:  siblings brother healthy:3 children  Mother dec pancreatic CA 2005, hx MVP 10 siblings all dec one dec young childhood disease others dec 70-90s uncle dec MI other details unk Maternal aunt dec 80s hx MV replacement: 4 children one F dec 30s lung CA Maternal uncles Maternal Grandmother dec 91 Maternal Grandfather dec 91  Father 90 yo Paternal aunts Paternal uncles Paternal Grandfather Paternal Grandmother  children: none  her maternal families originate from [place] and paternal families originate from [facility] No Ashkenazi Gnosticist ancestry.  Family history was  negative for consanguinity   No family history of SIDS    [FreeTextEntry2] : [place]  [FreeTextEntry3] : [place]

## 2024-01-10 NOTE — HISTORY OF PRESENT ILLNESS
[Home] : at home, [unfilled] , at the time of the visit. 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 at the time of her cardiac arrest she was participating in a virtual rehab session (post ankle surgery), surgery 12/20, then bedrest 3M then 4/20 began virtual rehab session was standing doing step up stretching sesion, not high cardiac demand  patient called out her husbands name , said something was wrong then she was standing then slid down the wall, had Loss off pulse, called 911 and he began CPR  was defib in the field  today she  is referred for a cardiogenomic evaluation  ZIO: Frequent PVCs and nonsustained AIVR  She was admitted to [facility], where she had:  Cardiac Cath--nl  Cardiac MRI--nl

## 2024-01-10 NOTE — PLAN
[TextEntry] : 1.	Verbal  Consent obtained for the combined [facility] sequencing and Del/Dup panel 138 genes (#808)  . A consent form will be emailed to the patient and upon receipt of the signed consent form. A DNA collection kit (buccal swab) will be sent to the patient for them to collect and send the sample to the lab; [facility] for analysis, pending insurance authorization.  3.	A follow-up appointment was scheduled in 2-3 months to discuss genetic testing results in person. Results generally return in 6-8 weeks.  For any additional questions please call  Laurie Nuñez MS, CELY or Kaden Parker MD, PhD at [phone] Time spent counseling the patient during the visit was 60 min. I spent 60 minutes on the encounter  Patient seen with Laurie Nuñez MS, CGC, board certified genetic counsellor

## 2024-01-12 ENCOUNTER — TRANSCRIPTION ENCOUNTER (OUTPATIENT)
Age: 59
End: 2024-01-12

## 2024-01-17 ENCOUNTER — TRANSCRIPTION ENCOUNTER (OUTPATIENT)
Age: 59
End: 2024-01-17

## 2024-02-24 NOTE — HISTORY OF PRESENT ILLNESS
[TextEntry] : Mrs Teixeira is a 59 yo F wit PMH of cardiac arrest, s/p ICD, MVP and VT.   Patient was in good health until April 30, 2020. She was in the midst of recovering from an ankle fracture, sustained from a fall on the ice in December, 2019. She was doing virtual physical therapy at home when she suddenly call out for assistance. Her , with who was home and who is a volunteer with the fire Department responded. He found her on the floor and summoned 911. When EMS arrived they attach her to a defibrillator and found her to be in ventricular fibrillation. She was successfully defibrillated.  Echo:May 15, 2020  Normal LV size and function  Mild to moderate MVP and mild to moderate MR CTA: Normal. Calcium score of 0. No CAD ZIO: Frequent PVCs and nonsustained AIVR She was admitted to [facility], where she had: Cardiac Cath--nl Cardiac MRI--nl  + Erythrocytosis  She is also seeing Dr. Mills of gastroenterology.  Thus far, workup for lupus anticoagulant, factor V Leyden, hemochromatosis, and serum protein electrophoresis are all normal.   Patient presents today for cardiogenomics evaluation.

## 2024-02-24 NOTE — DISCUSSION/SUMMARY
[TextEntry] : Mrs Teixeira is a 57 yo F wit PMH of cardiac arrest, s/p ICD, MVP and VT. K   The differences between hereditary and sporadic cardiomyopathy and arrhythmia were reviewed with the patient. Identifying genetic causes of Her condition may change medical management for the patient and may affect the healthcare of other family members. We discussed genetic tests that we could perform to address the possible genetic causes of her symptoms.   We also reviewed the risks, benefits, limitations, and implications of genetic testing.  After a review of testing options, the patient elected to undergo testing for [facility] Combined Cardiac Sequencing and Del/Dup Panel (test code 935), which analyzes 138 genes that are associated with varying levels of risk for cardiomyopathy and arrhythmia. We reviewed the three possible results for each gene on this panel: positive, negative and variant of uncertain significance (VUS).  If the results are positive, we would discuss the risks and management options associated with that susceptibility gene and discuss genetic testing for family members.  Pedigree was reviewed with the patient to identify those who should be tested if the patient is positive.  If results are negative or a VUS is identified, the patient would continue to be managed based on the personal and family history.   Patient expressed understanding of the presented information and satisfaction with having all of Her questions and concerns addressed.   We will follow up once the results are in.

## 2024-02-24 NOTE — ASSESSMENT
[TextEntry] : Mrs Teixeira is a 57 yo F wit PMH of cardiac arrest, s/p ICD, MVP and VT. The differences between hereditary and sporadic cardiomyopathy and arrhythmias were reviewed with the patient. She has elected to undergo genetic testing due to concerns for personal history, definitive diagnosis, disease management, family history, concern for the health of family members. Results may change medical management for the patient and may affect the healthcare of other family members. Patient expressed understanding of the presented information and satisfaction with having all of Her questions and concerns addressed. [facility] Combined cardiac panel will be ordered today.

## 2024-02-24 NOTE — PLAN
[TextEntry] : 1. Informed Consent was obtained for the [facility] Combined Cardiac Panel (test code #935) 2. A buccal swab sample collection kit will be sent to the patient's residence to collect the sample and send it to [facility] for analysis, pending insurance authorization. 3. A follow-up appointment was scheduled in 2 months to discuss genetic testing results. Results generally return in 4-6 weeks.   For any additional questions, please call Savannah Miller, MS, CELY, Cardiogenomic Program, at [phone]  Laurie Nuñez, MS, [facility] Assistant Chief, Pediatric Cardiogenomics, [facility]  in the Departments of Pediatrics and Cardiology, [employer], [facility]

## 2024-03-21 NOTE — DISCHARGE NOTE PROVIDER - NSDCQMERRANDS_GEN_ALL_CORE
No
What Is The Reason For Today's Visit?: Follow Up Non-Melanoma Skin Cancer
How Many Skin Cancers Have You Had?: more than one

## 2024-04-05 ENCOUNTER — APPOINTMENT (OUTPATIENT)
Dept: HEART AND VASCULAR | Facility: CLINIC | Age: 59
End: 2024-04-05
Payer: COMMERCIAL

## 2024-04-08 ENCOUNTER — NON-APPOINTMENT (OUTPATIENT)
Age: 59
End: 2024-04-08

## 2024-04-08 PROCEDURE — 93296 REM INTERROG EVL PM/IDS: CPT

## 2024-04-08 PROCEDURE — 93295 DEV INTERROG REMOTE 1/2/MLT: CPT

## 2024-06-03 PROBLEM — I34.1 PROLAPSING MITRAL LEAFLET SYNDROME: Status: ACTIVE | Noted: 2020-05-13

## 2024-06-03 PROBLEM — I34.1 MITRAL VALVE PROLAPSE: Status: ACTIVE | Noted: 2020-05-13

## 2024-06-03 PROBLEM — I46.9 CARDIOPULMONARY ARREST WITH SUCCESSFUL RESUSCITATION: Status: ACTIVE | Noted: 2020-05-13

## 2024-06-03 PROBLEM — I34.1 MITRAL PROLAPSE: Status: ACTIVE | Noted: 2021-05-06

## 2024-06-03 NOTE — ASSESSMENT
[FreeTextEntry1] : Assessment Encounter for preventive health examination (V70.0) (Z00.00) Mitral prolapse (424.0) (I34.1) Mitral regurgitation (424.0) (I34.0) Presence of implantable cardioverter-defibrillator (ICD) (V45.02) (Z95.810) Cardiopulmonary arrest with successful resuscitation (427.5) (I46.9) Dyslipidemia (272.4) (E78.5) #1) cardiorespiratory collapse and arrest.  At this point, I believe this patient had a primary arrhythmia probably due to her underlying mitral valve prolapse.  No evidence of CAD, cardiomyopathy.  S/P ICD, as she is high risk for recurrence.  Doing well. Asymptomatic without recurrence  ----We will repeat the echocardiogram to assess for any changes since May 2020--now 3 years.    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 I suspect that she has moderate mitral valvular insufficiency with his very prominent murmur. This certainly can induce ventricular tachyarrhythmias. MR does not warrant intervention,

## 2024-06-03 NOTE — HISTORY OF PRESENT ILLNESS
[FreeTextEntry1] :   The patient is a 57 year-old woman who is well and in her usual state of good health until April 30, 2020. She was in the midst of recovering from an ankle fracture, sustained from a fall on the ice in December, 2019. She was doing virtual physical therapy at home when she suddenly call out for assistance. Her , with who was home and who is a volunteer with the fire Department responded. He found her on the floor and summoned 911. When EMS arrived they attach her to a defibrillator and found her to be in ventricular fibrillation. She was successfully defibrillated. She was then taken to [facility] where she was hospitalized through May 4. During that admission the following testing was done: #1) CT of the chest: Left lower lobe collapse. No evidence of pulmonary embolism by PE protocol. #2) borderline troponins not consistent with acute MI #3) echocardiogram--reportedly showing normal LV function. No comment on mitral valve structure.   The patient was intubated and maintained on a ventilator. She was successfully extubated and treated for pneumonia with possible aspiration. She was discharged from the hospital on May 4 and has been home since.The discharge dx from [facility] was syncope. No evidence of cardiac arrhythmia on monitoring. She saw her primary care physician last week. He referred her to cardiology. The patient does carry a diagnosis of mitral valve prolapse. She has known of a heart murmur for many many years. Approximately 4 years ago, the patient had an episode of syncope for which she was placed on metoprolol tartrate 5 mg b.i.d. She continued to take metoprolol tartrate all the time of this event but has been off it since. =============================================================================== Recent Summary and w/u: original Echo: May 15, 2020           Normal LV size and function           Mild to moderate MVP and mild to moderate MR ======================================= F/U ECHO (12/8/23) With normal EF of 55%. Mild prolapse the anterior mitral leaflet with mild eccentric MR normal left atrium. No other significant abnormalities in no change from prior =============================== CTA: Normal. Calcium score of 0.  No CAD ZIO:  Frequent PVCs and nonsustained AIVR She was admitted to [facility], where she had: Cardiac Cath--nl Cardiac MRI--nl ===================================================================================== The patient has enjoyed good health. No history of hypertension or diabetes or hyperlipidemia. She is a nonsmoker--never. ================================================================================= Then, on 6/23/20  underwent Subcutaneous ICD ([employer] EMBLEM MRI with Dr. Pernell Petersen. This was a [employer] and one MRI S.-ICD (A219) with a serial #[id number]. This is a single chamber cardioverter defibrillator implanted without difficulty. =========================================================================================== NOW: She has undergone an extensive workup with Dr. Medina of hematology for polycythemia. She is also seeing Dr. Mills of gastroenterology. Thus far, workup for lupus anticoagulant, factor V Leyden, hemochromatosis, and serum protein electrophoresis are all normal. No abnormalities of any testing have been identified thus far.  She is feeling well. No specific complaints. Only medication is Toprol XL. She is having periodic ICD monitoring with Dr. Gimenez. Last check was Nov 2, 2022  EKG today-(performed due to hx of cardiac arrest and ICD placement): Sinus  Bradycardia  nl axis delayed R-wave progression again noted (Septal Q-waves V1--V3) NSC

## 2024-06-06 ENCOUNTER — APPOINTMENT (OUTPATIENT)
Dept: CARDIOLOGY | Facility: CLINIC | Age: 59
End: 2024-06-06

## 2024-06-06 DIAGNOSIS — I34.1 NONRHEUMATIC MITRAL (VALVE) PROLAPSE: ICD-10-CM

## 2024-06-06 DIAGNOSIS — I46.9 CARDIAC ARREST, CAUSE UNSPECIFIED: ICD-10-CM

## 2024-07-09 ENCOUNTER — APPOINTMENT (OUTPATIENT)
Dept: HEART AND VASCULAR | Facility: CLINIC | Age: 59
End: 2024-07-09
Payer: COMMERCIAL

## 2024-07-09 ENCOUNTER — NON-APPOINTMENT (OUTPATIENT)
Age: 59
End: 2024-07-09

## 2024-07-09 PROCEDURE — 93296 REM INTERROG EVL PM/IDS: CPT

## 2024-07-09 PROCEDURE — 93295 DEV INTERROG REMOTE 1/2/MLT: CPT

## 2024-07-29 ENCOUNTER — APPOINTMENT (OUTPATIENT)
Dept: CARDIOLOGY | Facility: CLINIC | Age: 59
End: 2024-07-29

## 2024-07-29 PROCEDURE — 96040: CPT | Mod: 95

## 2024-08-08 ENCOUNTER — APPOINTMENT (OUTPATIENT)
Dept: CARDIOLOGY | Facility: CLINIC | Age: 59
End: 2024-08-08

## 2024-08-08 ENCOUNTER — NON-APPOINTMENT (OUTPATIENT)
Age: 59
End: 2024-08-08

## 2024-08-08 PROCEDURE — 99214 OFFICE O/P EST MOD 30 MIN: CPT | Mod: 25

## 2024-08-08 PROCEDURE — 93000 ELECTROCARDIOGRAM COMPLETE: CPT

## 2024-08-08 PROCEDURE — 36415 COLL VENOUS BLD VENIPUNCTURE: CPT

## 2024-08-08 NOTE — HISTORY OF PRESENT ILLNESS
[FreeTextEntry1] : The patient is a 58 year-old woman who is well and in her usual state of good health until April 30, 2020. She was in the midst of recovering from an ankle fracture, sustained from a fall on the ice in December, 2019. She was doing virtual physical therapy at home when she suddenly call out for assistance. Her , with who was home and who is a volunteer with the fire Department responded. He found her on the floor and summoned 911. When EMS arrived they attach her to a defibrillator and found her to be in ventricular fibrillation. She was successfully defibrillated. She was then taken to [facility] where she was hospitalized through May 4. During that admission the following testing was done: #1) CT of the chest: Left lower lobe collapse. No evidence of pulmonary embolism by PE protocol. #2) borderline troponins not consistent with acute MI #3) echocardiogram--reportedly showing normal LV function. No comment on mitral valve structure.   The patient was intubated and maintained on a ventilator. She was successfully extubated and treated for pneumonia with possible aspiration. She was discharged from the hospital on May 4 and has been home since.The discharge dx from [facility] was syncope. No evidence of cardiac arrhythmia on monitoring. She saw her primary care physician last week. He referred her to cardiology. The patient does carry a diagnosis of mitral valve prolapse. She has known of a heart murmur for many many years. Approximately 4 years ago, the patient had an episode of syncope for which she was placed on metoprolol tartrate 5 mg b.i.d. She continued to take metoprolol tartrate all the time of this event but has been off it since. =============================================================================== Recent Summary and w/u: original Echo: May 15, 2020           Normal LV size and function           Mild to moderate MVP and mild to moderate MR ======================================= F/U ECHO (12/8/23) With normal EF of 55%. Mild prolapse the anterior mitral leaflet with mild eccentric MR normal left atrium. No other significant abnormalities in no change from prior =============================== CTA: Normal. Calcium score of 0.  No CAD ZIO:  Frequent PVCs and nonsustained AIVR She was admitted to [facility], where she had: Cardiac Cath--nl Cardiac MRI--nl ===================================================================================== The patient has enjoyed good health. No history of hypertension or diabetes or hyperlipidemia. She is a nonsmoker--never. ================================================================================= Then, on 6/23/20  underwent Subcutaneous ICD ([employer] EMBLEM MRI with Dr. Pernell Petersen. This was a [employer] and one MRI S.-ICD (A219) with a serial #[id number]. This is a single chamber cardioverter defibrillator implanted without difficulty. =========================================================================================== NOW: She has undergone an extensive workup with Dr. Medina of hematology for polycythemia. She is also seeing Dr. Mills of gastroenterology. Thus far, workup for lupus anticoagulant, factor V Leyden, hemochromatosis, and serum protein electrophoresis are all normal. No abnormalities of any testing have been identified thus far.  She is feeling well. No specific complaints. Only medication is Toprol XL. She is having periodic ICD monitoring with Dr. Gimenez. Last check was Nov 2, 2022  EKG today-(performed due to hx of cardiac arrest and ICD placement): Sinus Bradycardia at 48/min nl axis delayed R-wave progression again noted  NSC

## 2024-08-08 NOTE — ASSESSMENT
[FreeTextEntry1] : Assessment Encounter for preventive health examination (V70.0) (Z00.00) Mitral prolapse (424.0) (I34.1) Mitral regurgitation (424.0) (I34.0) Presence of implantable cardioverter-defibrillator (ICD) (V45.02) (Z95.810) Cardiopulmonary arrest with successful resuscitation (427.5) (I46.9) Dyslipidemia (272.4) (E78.5) #1) cardiorespiratory collapse and arrest.  At this point, I believe this patient had a primary arrhythmia probably due to her underlying mitral valve prolapse.  No evidence of CAD, cardiomyopathy.  S/P ICD, as she is high risk for recurrence.  Doing well. 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 I suspect that she has moderate mitral valvular insufficiency with his very prominent murmur. This certainly can induce ventricular tachyarrhythmias. MR does not warrant intervention,

## 2024-08-08 NOTE — HISTORY OF PRESENT ILLNESS
[FreeTextEntry1] : The patient is a 58 year-old woman who is well and in her usual state of good health until April 30, 2020. She was in the midst of recovering from an ankle fracture, sustained from a fall on the ice in December, 2019. She was doing virtual physical therapy at home when she suddenly call out for assistance. Her , with who was home and who is a volunteer with the fire Department responded. He found her on the floor and summoned 911. When EMS arrived they attach her to a defibrillator and found her to be in ventricular fibrillation. She was successfully defibrillated. She was then taken to [facility] where she was hospitalized through May 4. During that admission the following testing was done: #1) CT of the chest: Left lower lobe collapse. No evidence of pulmonary embolism by PE protocol. #2) borderline troponins not consistent with acute MI #3) echocardiogram--reportedly showing normal LV function. No comment on mitral valve structure.   The patient was intubated and maintained on a ventilator. She was successfully extubated and treated for pneumonia with possible aspiration. She was discharged from the hospital on May 4 and has been home since.The discharge dx from [facility] was syncope. No evidence of cardiac arrhythmia on monitoring. She saw her primary care physician last week. He referred her to cardiology. The patient does carry a diagnosis of mitral valve prolapse. She has known of a heart murmur for many many years. Approximately 4 years ago, the patient had an episode of syncope for which she was placed on metoprolol tartrate 5 mg b.i.d. She continued to take metoprolol tartrate all the time of this event but has been off it since. =============================================================================== Recent Summary and w/u: original Echo: May 15, 2020           Normal LV size and function           Mild to moderate MVP and mild to moderate MR ======================================= F/U ECHO (12/8/23) With normal EF of 55%. Mild prolapse the anterior mitral leaflet with mild eccentric MR normal left atrium. No other significant abnormalities in no change from prior =============================== CTA: Normal. Calcium score of 0.  No CAD ZIO:  Frequent PVCs and nonsustained AIVR She was admitted to [facility], where she had: Cardiac Cath--nl Cardiac MRI--nl ===================================================================================== The patient has enjoyed good health. No history of hypertension or diabetes or hyperlipidemia. She is a nonsmoker--never. ================================================================================= Then, on 6/23/20  underwent Subcutaneous ICD ([employer] EMBLEM MRI with Dr. Pernell Petersen. This was a [employer] and one MRI S.-ICD (A219) with a serial #[id number]. This is a single chamber cardioverter defibrillator implanted without difficulty. =========================================================================================== NOW: She has undergone an extensive workup with Dr. Medina of hematology for polycythemia. She is also seeing Dr. Mills of gastroenterology. Thus far, workup for lupus anticoagulant, factor V Leyden, hemochromatosis, and serum protein electrophoresis are all normal. No abnormalities of any testing have been identified thus far.  She is feeling well. No specific complaints. Only medication is Toprol XL. She is having periodic ICD monitoring with Dr. Gimenez. Last check was Nov 2, 2022  EKG today-(performed due to hx of cardiac arrest and ICD placement): Sinus Bradycardia at 48/min nl axis delayed R-wave progression again noted  NSC

## 2024-08-08 NOTE — PHYSICAL EXAM
[General Appearance - Well Developed] : well developed [Normal Appearance] : normal appearance [Well Groomed] : well groomed [General Appearance - Well Nourished] : well nourished [No Deformities] : no deformities [General Appearance - In No Acute Distress] : no acute distress [Normal Conjunctiva] : the conjunctiva exhibited no abnormalities [Eyelids - No Xanthelasma] : the eyelids demonstrated no xanthelasmas [Normal Oral Mucosa] : normal oral mucosa [No Oral Pallor] : no oral pallor [No Oral Cyanosis] : no oral cyanosis [Normal Jugular Venous A Waves Present] : normal jugular venous A waves present [Normal Jugular Venous V Waves Present] : normal jugular venous V waves present [No Jugular Venous Blake A Waves] : no jugular venous blake A waves [Exaggerated Use Of Accessory Muscles For Inspiration] : no accessory muscle use [Respiration, Rhythm And Depth] : normal respiratory rhythm and effort [Auscultation Breath Sounds / Voice Sounds] : lungs were clear to auscultation bilaterally [Abdomen Soft] : soft [Abdomen Tenderness] : non-tender [Abdomen Mass (___ Cm)] : no abdominal mass palpated [Abnormal Walk] : normal gait [Gait - Sufficient For Exercise Testing] : the gait was sufficient for exercise testing [Nail Clubbing] : no clubbing of the fingernails [Cyanosis, Localized] : no localized cyanosis [Petechial Hemorrhages (___cm)] : no petechial hemorrhages [Skin Color & Pigmentation] : normal skin color and pigmentation [] : no rash [No Venous Stasis] : no venous stasis [Skin Lesions] : no skin lesions [No Skin Ulcers] : no skin ulcer [No Xanthoma] : no  xanthoma was observed [Oriented To Time, Place, And Person] : oriented to person, place, and time [Affect] : the affect was normal [Mood] : the mood was normal [No Anxiety] : not feeling anxious [5th Left ICS - MCL] : palpated at the 5th LICS in the midclavicular line [Normal] : normal [Normal Rate] : normal [Rhythm Regular] : regular [Normal S1] : normal S1 [Normal S2] : normal S2 [Click] : a ~M click was heard [No Murmur] : no murmurs heard [III] : a grade 3 [No Abnormalities] : the abdominal aorta was not enlarged and no bruit was heard [No Pitting Edema] : no pitting edema present [Right Carotid Bruit] : no bruit heard over the right carotid [Left Carotid Bruit] : no bruit heard over the left carotid [Right Femoral Bruit] : no bruit heard over the right femoral artery [Left Femoral Bruit] : no bruit heard over the left femoral artery [Bruit] : no bruit heard [Rt] : no varicose veins of the right leg [Lt] : no varicose veins of the left leg

## 2024-08-14 NOTE — DISCHARGE NOTE NURSING/CASE MANAGEMENT/SOCIAL WORK - NSDCPETBCESMAN_GEN_ALL_CORE
Sclerae : White without injection , Conjuntivae and eyelids appear normal
If you are a smoker, it is important for your health to stop smoking. Please be aware that second hand smoke is also harmful.

## 2024-08-19 ENCOUNTER — TRANSCRIPTION ENCOUNTER (OUTPATIENT)
Age: 59
End: 2024-08-19

## 2024-09-17 ENCOUNTER — RESULT REVIEW (OUTPATIENT)
Age: 59
End: 2024-09-17

## 2024-09-17 ENCOUNTER — APPOINTMENT (OUTPATIENT)
Dept: HEMATOLOGY ONCOLOGY | Facility: CLINIC | Age: 59
End: 2024-09-17

## 2024-09-17 VITALS
OXYGEN SATURATION: 99 % | HEIGHT: 65 IN | TEMPERATURE: 97.1 F | RESPIRATION RATE: 16 BRPM | HEART RATE: 49 BPM | WEIGHT: 178.5 LBS | BODY MASS INDEX: 29.74 KG/M2 | SYSTOLIC BLOOD PRESSURE: 151 MMHG | DIASTOLIC BLOOD PRESSURE: 69 MMHG

## 2024-09-24 ENCOUNTER — APPOINTMENT (OUTPATIENT)
Dept: HEMATOLOGY ONCOLOGY | Facility: CLINIC | Age: 59
End: 2024-09-24
Payer: COMMERCIAL

## 2024-09-24 VITALS
DIASTOLIC BLOOD PRESSURE: 82 MMHG | BODY MASS INDEX: 29.22 KG/M2 | SYSTOLIC BLOOD PRESSURE: 130 MMHG | RESPIRATION RATE: 16 BRPM | HEIGHT: 65 IN | TEMPERATURE: 97.9 F | OXYGEN SATURATION: 96 % | HEART RATE: 69 BPM | WEIGHT: 175.38 LBS

## 2024-09-24 DIAGNOSIS — D75.1 SECONDARY POLYCYTHEMIA: ICD-10-CM

## 2024-09-24 PROCEDURE — 99213 OFFICE O/P EST LOW 20 MIN: CPT

## 2024-09-24 RX ORDER — KRILL/OM-3/DHA/EPA/PHOSPHO/AST 1000-230MG
CAPSULE ORAL
Refills: 0 | Status: ACTIVE | COMMUNITY

## 2024-09-24 NOTE — BEGINNING OF VISIT
[0] : 2) Feeling down, depressed, or hopeless: Not at all (0) [PHQ-2 Negative] : PHQ-2 Negative [Pain Scale: ___] : On a scale of 1-10, today the patient's pain is a(n) [unfilled]. [Never] : Never [Date Discussed (MM/DD/YY): ___] : Discussed: [unfilled] [Diarrhea Character] : Diarrhea: Grade 0 [Abdominal Pain] : no abdominal pain [Vomiting] : no vomiting [Constipation] : no constipation

## 2024-09-25 NOTE — HISTORY OF PRESENT ILLNESS
[Home] : at home, [unfilled] , at the time of the visit. [Medical Office: ([facility])___] : at the medical office located in  [Verbal consent obtained from patient] : the patient, [unfilled] [de-identified] : Ms. Shanon Teixeira is 55 year old female with history of erythrocytosis, referred by Dr. Josr Carlson  Patient with history of mitral valve prolapse, ventricular fibrillation who was on bedrest from Jan 2020 to March 2020 from right ankle fracture, started PT and then had cardiac arrest at home in April 2020 during one of her PT session s/p CPR with successful resuscitation and subcutaneous defibrillator placement in June 2020 with Dr. Josr Carlson and Dr. Jimmie Rees  2/12/21 wbc 8.10 Hgb 15.6 hct 50.7 mcv 91.7 plts 340 RBC 5.53 Ca 10.7 Alk phos - 141  11/5/20 Hgb 15.4 hct 48.6 MCV 88.4 Ca - 10.4 alk phos  - 132 Hgb/Hct normalized in July 2020  SHx: never smoked No family history of hematological and/or oncological.  Post menopausal since 2016. Patient had osteopenia on foot 15 yrs ago, on Fosamax x 2 years and now osteopenia on wrist - last Dexa was Oct 2020. She  has been taking Ca and  [de-identified] : Patient is here for follow up   She's doing well Denies any signs and symptoms of VTEs.

## 2024-09-25 NOTE — HISTORY OF PRESENT ILLNESS
[Home] : at home, [unfilled] , at the time of the visit. [Medical Office: ([facility])___] : at the medical office located in  [Verbal consent obtained from patient] : the patient, [unfilled] [de-identified] : Ms. Shanon Teixeira is 55 year old female with history of erythrocytosis, referred by Dr. Josr Carlson  Patient with history of mitral valve prolapse, ventricular fibrillation who was on bedrest from Jan 2020 to March 2020 from right ankle fracture, started PT and then had cardiac arrest at home in April 2020 during one of her PT session s/p CPR with successful resuscitation and subcutaneous defibrillator placement in June 2020 with Dr. Josr Carlson and Dr. Jimmie Rees  2/12/21 wbc 8.10 Hgb 15.6 hct 50.7 mcv 91.7 plts 340 RBC 5.53 Ca 10.7 Alk phos - 141  11/5/20 Hgb 15.4 hct 48.6 MCV 88.4 Ca - 10.4 alk phos  - 132 Hgb/Hct normalized in July 2020  SHx: never smoked No family history of hematological and/or oncological.  Post menopausal since 2016. Patient had osteopenia on foot 15 yrs ago, on Fosamax x 2 years and now osteopenia on wrist - last Dexa was Oct 2020. She  has been taking Ca and  [de-identified] : Patient is here for follow up   She's doing well Denies any signs and symptoms of VTEs.

## 2024-09-25 NOTE — ASSESSMENT
[FreeTextEntry1] : ## Erythrocytosis Had syncope s/p ICD implant for SCD, due to MVP and VT Cardiac Cath--nl Cardiac MRI--nl No family ho sudden cardiac death at early age, DVT or PE She had first trimester . No pregnancies Was on birth control pills for most part of her life and never had blood clot No CVA May 2021 normal sleep study -Normal epo with negative REBECA/CALR/MPL- rules out P Vera -Thrombophilia work up largely negative except for borderline IgM B2 glycoprotein AB and anticardiolipin AB screen  - negative for Anticardiolipin AB screen 3 months later. Repeated labs later with positive lupus anticoagulant and slight elevation of igM B12.  APS diagnosis is not r/o.  -She is clinically doing well  and aware for signs and symptoms of VTEs. -Labs reviewed and discussed -Erythrocytosis resolved with improved Ferritin -She'll follow up with her PCP from now and f/u with us prn  SocialHx: never smoked Lives in [facility] but comes up to [place] a few times a year for work Advised to be uptodate with all her screenings .  Advised to follow up PRN.   d/w Dr. Medina  follow up prn CBC, CMP, iron studies, ferritin

## 2024-09-25 NOTE — ASSESSMENT
[FreeTextEntry1] : ## Erythrocytosis Had syncope s/p ICD implant for SCD, due to MVP and VT Cardiac Cath--nl Cardiac MRI--nl No family ho sudden cardiac death at early age, DVT or PE She had first trimester . No pregnancies Was on birth control pills for most part of her life and never had blood clot No CVA May 2021 normal sleep study -Normal epo with negative REBECA/CALR/MPL- rules out P Vera -Thrombophilia work up largely negative except for borderline IgM B2 glycoprotein AB and anticardiolipin AB screen  - negative for Anticardiolipin AB screen 3 months later. Repeated labs later with positive lupus anticoagulant and slight elevation of igM B12.  APS diagnosis is not r/o.  -She is clinically doing well  and aware for signs and symptoms of VTEs. -Labs reviewed and discussed -Erythrocytosis resolved with improved Ferritin -She'll follow up with her PCP from now and f/u with us prn  SocialHx: never smoked Lives in [facility] but comes up to [place] a few times a year for work Advised to be uptodate with all her screenings .  Advised to follow up PRN.   d/w Dr. Medina  follow up prn CBC, CMP, iron studies, ferritin

## 2024-09-25 NOTE — HISTORY OF PRESENT ILLNESS
[Home] : at home, [unfilled] , at the time of the visit. [Medical Office: ([facility])___] : at the medical office located in  [Verbal consent obtained from patient] : the patient, [unfilled] [de-identified] : Ms. Shanon Teixeira is 55 year old female with history of erythrocytosis, referred by Dr. Josr Carlson  Patient with history of mitral valve prolapse, ventricular fibrillation who was on bedrest from Jan 2020 to March 2020 from right ankle fracture, started PT and then had cardiac arrest at home in April 2020 during one of her PT session s/p CPR with successful resuscitation and subcutaneous defibrillator placement in June 2020 with Dr. Josr Carlson and Dr. Jimmie Rees  2/12/21 wbc 8.10 Hgb 15.6 hct 50.7 mcv 91.7 plts 340 RBC 5.53 Ca 10.7 Alk phos - 141  11/5/20 Hgb 15.4 hct 48.6 MCV 88.4 Ca - 10.4 alk phos  - 132 Hgb/Hct normalized in July 2020  SHx: never smoked No family history of hematological and/or oncological.  Post menopausal since 2016. Patient had osteopenia on foot 15 yrs ago, on Fosamax x 2 years and now osteopenia on wrist - last Dexa was Oct 2020. She  has been taking Ca and  [de-identified] : Patient is here for follow up   She's doing well Denies any signs and symptoms of VTEs.

## 2024-09-25 NOTE — ASSESSMENT
[FreeTextEntry1] : ## Erythrocytosis Had syncope s/p ICD implant for SCD, due to MVP and VT Cardiac Cath--nl Cardiac MRI--nl No family ho sudden cardiac death at early age, DVT or PE She had first trimester . No pregnancies Was on birth control pills for most part of her life and never had blood clot No CVA May 2021 normal sleep study -Normal epo with negative REBECA/CALR/MPL- rules out P Vera -Thrombophilia work up largely negative except for borderline IgM B2 glycoprotein AB and anticardiolipin AB screen  - negative for Anticardiolipin AB screen 3 months later. Repeated labs later with positive lupus anticoagulant and slight elevation of igM B12.  APS diagnosis is not r/o.  -She is clinically doing well  and aware for signs and symptoms of VTEs. -Labs reviewed and discussed -Erythrocytosis resolved with improved Ferritin -She'll follow up with her PCP from now and f/u with us prn  SocialHx: never smoked Lives in [facility] but comes up to [place] a few times a year for work Advised to be uptodate with all her screenings .  Advised to follow up PRN.   d/w Dr. Medina  follow up prn CBC, CMP, iron studies, ferritin

## 2024-09-25 NOTE — ASSESSMENT
[FreeTextEntry1] : ## Erythrocytosis Had syncope s/p ICD implant for SCD, due to MVP and VT Cardiac Cath--nl Cardiac MRI--nl No family ho sudden cardiac death at early age, DVT or PE She had first trimester . No pregnancies Was on birth control pills for most part of her life and never had blood clot No CVA May 2021 normal sleep study -Normal epo with negative REBECA/CALR/MPL- rules out P Vera -Thrombophilia work up largely negative except for borderline IgM B2 glycoprotein AB and anticardiolipin AB screen  - negative for Anticardiolipin AB screen 3 months later. Repeated labs later with positive lupus anticoagulant and slight elevation of igM B12.  APS diagnosis is not r/o.  -She is clinically doing well  and aware for signs and symptoms of VTEs. -Labs reviewed and discussed -Erythrocytosis resolved with improved Ferritin -She'll follow up with her PCP from now and f/u with us prn  SocialHx: never smoked Lives in [facility] but comes up to [place] a few times a year for work Advised to be uptodate with all her screenings .  Advised to follow up PRN.   d/w Dr. Medina  follow up prn CBC, CMP, iron studies, ferritin

## 2024-09-25 NOTE — HISTORY OF PRESENT ILLNESS
[Home] : at home, [unfilled] , at the time of the visit. [Medical Office: ([facility])___] : at the medical office located in  [Verbal consent obtained from patient] : the patient, [unfilled] [de-identified] : Ms. Shanon Teixeira is 55 year old female with history of erythrocytosis, referred by Dr. Josr Carlson  Patient with history of mitral valve prolapse, ventricular fibrillation who was on bedrest from Jan 2020 to March 2020 from right ankle fracture, started PT and then had cardiac arrest at home in April 2020 during one of her PT session s/p CPR with successful resuscitation and subcutaneous defibrillator placement in June 2020 with Dr. Josr Carlson and Dr. Jimmie Rees  2/12/21 wbc 8.10 Hgb 15.6 hct 50.7 mcv 91.7 plts 340 RBC 5.53 Ca 10.7 Alk phos - 141  11/5/20 Hgb 15.4 hct 48.6 MCV 88.4 Ca - 10.4 alk phos  - 132 Hgb/Hct normalized in July 2020  SHx: never smoked No family history of hematological and/or oncological.  Post menopausal since 2016. Patient had osteopenia on foot 15 yrs ago, on Fosamax x 2 years and now osteopenia on wrist - last Dexa was Oct 2020. She  has been taking Ca and  [de-identified] : Patient is here for follow up   She's doing well Denies any signs and symptoms of VTEs.

## 2024-10-03 ENCOUNTER — APPOINTMENT (OUTPATIENT)
Dept: HEART AND VASCULAR | Facility: CLINIC | Age: 59
End: 2024-10-03
Payer: COMMERCIAL

## 2024-10-03 DIAGNOSIS — I47.20 VENTRICULAR TACHYCARDIA, UNSPECIFIED: ICD-10-CM

## 2024-10-03 DIAGNOSIS — R55 SYNCOPE AND COLLAPSE: ICD-10-CM

## 2024-10-03 PROCEDURE — 99442: CPT

## 2024-10-03 NOTE — HISTORY OF PRESENT ILLNESS
[de-identified] :  As per the patients request this visit was performed using the User[employer] audio  platform. All components of the evaluation and management were performed per clinical routine with the exception of the in-person physical exam. Available physiologic and diagnostic data were reviewed in detail, (e.g. remote monitoring reports, ambulatory vitals, results of prior testing). Verbal consent was obtained before proceeding with this encounter . [facility] location - office at [facility].  The patient is located at their home.  Length of visit 12 mins  59 year old female with cardiac arrest due to a shockable rhythm with a reduced EF and frequent short coupled PVCs and non obstructive CAD s/p SQ ICD 2020, who presents for follow up.  She is doing well.  No device related issues.  No palpitations, chest pain, syncope or near syncope.  Genetic testing was done and thus far unrevealing.

## 2024-10-03 NOTE — DISCUSSION/SUMMARY
[FreeTextEntry1] : 59 year old female with cardiac arrest due to a shockable rhythm with a reduced EF and frequent short coupled PVCs and non obstructive CAD s/p SQ ICD 2020, who presents for follow up.  Device interrogation from this am with normal function and no arrhythmias.  She is doing well and uses remote monitoring.  She will follow up in 1 year or sooner if needed.  She knows to call with any questions or concerns.

## 2024-10-07 ENCOUNTER — APPOINTMENT (OUTPATIENT)
Dept: HEART AND VASCULAR | Facility: CLINIC | Age: 59
End: 2024-10-07

## 2025-01-13 ENCOUNTER — NON-APPOINTMENT (OUTPATIENT)
Age: 60
End: 2025-01-13

## 2025-01-13 ENCOUNTER — APPOINTMENT (OUTPATIENT)
Dept: HEART AND VASCULAR | Facility: CLINIC | Age: 60
End: 2025-01-13

## 2025-01-13 PROCEDURE — 93296 REM INTERROG EVL PM/IDS: CPT

## 2025-01-13 PROCEDURE — 93295 DEV INTERROG REMOTE 1/2/MLT: CPT

## 2025-02-06 ENCOUNTER — APPOINTMENT (OUTPATIENT)
Dept: CARDIOLOGY | Facility: CLINIC | Age: 60
End: 2025-02-06

## 2025-02-06 DIAGNOSIS — I46.9 CARDIAC ARREST, CAUSE UNSPECIFIED: ICD-10-CM

## 2025-02-06 DIAGNOSIS — I49.01 VENTRICULAR FIBRILLATION: ICD-10-CM

## 2025-02-06 DIAGNOSIS — Z00.00 ENCOUNTER FOR GENERAL ADULT MEDICAL EXAMINATION W/OUT ABNORMAL FINDINGS: ICD-10-CM

## 2025-02-06 DIAGNOSIS — Z95.810 PRESENCE OF AUTOMATIC (IMPLANTABLE) CARDIAC DEFIBRILLATOR: ICD-10-CM

## 2025-02-06 DIAGNOSIS — D58.2 OTHER HEMOGLOBINOPATHIES: ICD-10-CM

## 2025-02-06 DIAGNOSIS — I34.1 NONRHEUMATIC MITRAL (VALVE) PROLAPSE: ICD-10-CM

## 2025-03-27 ENCOUNTER — APPOINTMENT (OUTPATIENT)
Dept: CARDIOLOGY | Facility: CLINIC | Age: 60
End: 2025-03-27
Payer: COMMERCIAL

## 2025-03-27 ENCOUNTER — NON-APPOINTMENT (OUTPATIENT)
Age: 60
End: 2025-03-27

## 2025-03-27 VITALS
SYSTOLIC BLOOD PRESSURE: 120 MMHG | TEMPERATURE: 97 F | OXYGEN SATURATION: 98 % | DIASTOLIC BLOOD PRESSURE: 78 MMHG | HEART RATE: 60 BPM

## 2025-03-27 DIAGNOSIS — I34.1 NONRHEUMATIC MITRAL (VALVE) PROLAPSE: ICD-10-CM

## 2025-03-27 DIAGNOSIS — Z95.810 PRESENCE OF AUTOMATIC (IMPLANTABLE) CARDIAC DEFIBRILLATOR: ICD-10-CM

## 2025-03-27 DIAGNOSIS — E78.5 HYPERLIPIDEMIA, UNSPECIFIED: ICD-10-CM

## 2025-03-27 PROCEDURE — 93000 ELECTROCARDIOGRAM COMPLETE: CPT

## 2025-03-27 PROCEDURE — 99214 OFFICE O/P EST MOD 30 MIN: CPT | Mod: 25

## 2025-04-14 ENCOUNTER — APPOINTMENT (OUTPATIENT)
Dept: HEART AND VASCULAR | Facility: CLINIC | Age: 60
End: 2025-04-14
Payer: COMMERCIAL

## 2025-04-14 ENCOUNTER — NON-APPOINTMENT (OUTPATIENT)
Age: 60
End: 2025-04-14

## 2025-04-14 PROCEDURE — 93296 REM INTERROG EVL PM/IDS: CPT

## 2025-04-14 PROCEDURE — 93295 DEV INTERROG REMOTE 1/2/MLT: CPT

## 2025-07-14 ENCOUNTER — NON-APPOINTMENT (OUTPATIENT)
Age: 60
End: 2025-07-14

## 2025-07-14 ENCOUNTER — APPOINTMENT (OUTPATIENT)
Dept: HEART AND VASCULAR | Facility: CLINIC | Age: 60
End: 2025-07-14
Payer: SELF-PAY

## 2025-07-14 PROCEDURE — 93295 DEV INTERROG REMOTE 1/2/MLT: CPT

## 2025-07-14 PROCEDURE — 93296 REM INTERROG EVL PM/IDS: CPT

## 2025-09-09 ENCOUNTER — APPOINTMENT (OUTPATIENT)
Dept: CARDIOLOGY | Facility: CLINIC | Age: 60
End: 2025-09-09
Payer: COMMERCIAL

## 2025-09-09 VITALS
HEART RATE: 69 BPM | DIASTOLIC BLOOD PRESSURE: 68 MMHG | OXYGEN SATURATION: 96 % | BODY MASS INDEX: 25.83 KG/M2 | SYSTOLIC BLOOD PRESSURE: 100 MMHG | WEIGHT: 155 LBS | RESPIRATION RATE: 14 BRPM | HEIGHT: 65 IN

## 2025-09-09 DIAGNOSIS — Z95.810 PRESENCE OF AUTOMATIC (IMPLANTABLE) CARDIAC DEFIBRILLATOR: ICD-10-CM

## 2025-09-09 DIAGNOSIS — I46.9 CARDIAC ARREST, CAUSE UNSPECIFIED: ICD-10-CM

## 2025-09-09 DIAGNOSIS — Z86.79 PERSONAL HISTORY OF OTHER DISEASES OF THE CIRCULATORY SYSTEM: ICD-10-CM

## 2025-09-09 DIAGNOSIS — I34.0 NONRHEUMATIC MITRAL (VALVE) INSUFFICIENCY: ICD-10-CM

## 2025-09-09 DIAGNOSIS — I34.1 NONRHEUMATIC MITRAL (VALVE) PROLAPSE: ICD-10-CM

## 2025-09-09 PROCEDURE — 99214 OFFICE O/P EST MOD 30 MIN: CPT

## 2025-09-09 PROCEDURE — 93000 ELECTROCARDIOGRAM COMPLETE: CPT

## 2025-09-09 RX ORDER — METOPROLOL SUCCINATE 25 MG/1
25 TABLET, EXTENDED RELEASE ORAL DAILY
Qty: 90 | Refills: 3 | Status: ACTIVE | COMMUNITY
Start: 1900-01-01 | End: 1900-01-01

## 2025-09-10 RX ORDER — METOPROLOL SUCCINATE 25 MG/1
25 TABLET, EXTENDED RELEASE ORAL
Qty: 90 | Refills: 3 | Status: DISCONTINUED | COMMUNITY
End: 2025-09-10

## 2025-09-11 ENCOUNTER — APPOINTMENT (OUTPATIENT)
Dept: CARDIOLOGY | Facility: CLINIC | Age: 60
End: 2025-09-11